# Patient Record
Sex: MALE | Race: WHITE | NOT HISPANIC OR LATINO | Employment: OTHER | URBAN - METROPOLITAN AREA
[De-identification: names, ages, dates, MRNs, and addresses within clinical notes are randomized per-mention and may not be internally consistent; named-entity substitution may affect disease eponyms.]

---

## 2020-12-04 ENCOUNTER — CONSULT (OUTPATIENT)
Dept: CARDIOLOGY | Facility: CLINIC | Age: 55
End: 2020-12-04

## 2020-12-04 VITALS
WEIGHT: 260.8 LBS | SYSTOLIC BLOOD PRESSURE: 150 MMHG | DIASTOLIC BLOOD PRESSURE: 96 MMHG | BODY MASS INDEX: 33.47 KG/M2 | HEIGHT: 74 IN | OXYGEN SATURATION: 98 % | HEART RATE: 67 BPM

## 2020-12-04 DIAGNOSIS — E78.00 PURE HYPERCHOLESTEROLEMIA: Primary | ICD-10-CM

## 2020-12-04 DIAGNOSIS — I25.10 CAD IN NATIVE ARTERY: ICD-10-CM

## 2020-12-04 PROCEDURE — 99243 OFF/OP CNSLTJ NEW/EST LOW 30: CPT | Performed by: PHYSICIAN ASSISTANT

## 2020-12-04 PROCEDURE — 93000 ELECTROCARDIOGRAM COMPLETE: CPT | Performed by: PHYSICIAN ASSISTANT

## 2020-12-04 RX ORDER — ASPIRIN 81 MG/1
81 TABLET ORAL DAILY
Qty: 30 TABLET | Refills: 30 | Status: SHIPPED | OUTPATIENT
Start: 2020-12-04

## 2020-12-04 RX ORDER — LISINOPRIL 5 MG/1
5 TABLET ORAL DAILY
COMMUNITY
Start: 2020-11-09

## 2020-12-04 RX ORDER — MULTIPLE VITAMINS W/ MINERALS TAB 9MG-400MCG
1 TAB ORAL DAILY
COMMUNITY

## 2020-12-04 NOTE — PROGRESS NOTES
[facility]  INITIAL OFFICE CONSULT      Mal Magallanes  1965  PCP: Sherry Herrera PA    SUBJECTIVE:   Mal Magallanes is a 55 y.o. male seen for a consultation visit regarding the following:     Chief Complaint:   Chief Complaint   Patient presents with   • Dizziness     consult          Consultation is requested by MURTAZA Roberts for evaluation of Dizziness (consult)        History:  Pleasant 55-year-old gent referred to our office regarding evaluation for abnormal coronary calcium score test, hypertension, dyslipidemia and strong family history of coronary disease.  The patient reports approximate 5 years ago he had a cardiac evaluation with Dr. Ryan Jackson.  During this time he remember some musculoskeletal type discomfort but underwent a stress test with Dr. Jackson which was normal.  In addition he had normal echocardiogram.  More recently he has had a difficult time trying to manage his risk factors cortices such as dyslipidemia is intolerant to multiple statin drugs and has tried every statin available at all these have caused significant myalgias muscle achiness weakness.  He states his blood pressures well controlled on Zestril therapy.  He states he has not had any chest pain or chest tightness suggesting angina pectoris.  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  He denies any chest pain when doing these activities.  He denies palpitations dizziness near syncope syncope.  He states he has family history of multiple brothers having MI and stents and therefore he would like to pursue further preventative medical therapy such as considering alternative treatments  of his familial dyslipidemia.      Cardiac PMH: (Old records have been reviewed and summarized below)  1. Atypical chest pain  a. Negative Stress test 2015, No ischemia Dr. Ryan Jackson  b. Normal Echocardiogram Normal EF, no VHD>   2. HLD  a. Intolerance to all statins due to  Myalgias  b.  10/2020  3. Abnormal Coronary Calcium score of 372 suggestive of Moderate CAD  4. HTN: Controlled on Zestril.   5. Mild Obesity  6. Remote traumatic injury, Kidney contusion, Eye injury  7. GERD  8. Family history of coronary disease    Past Medical History, Past Surgical History, Family history, Social History, and Medications were all reviewed with the patient today and updated as necessary.     Current Outpatient Medications   Medication Sig Dispense Refill   • lisinopril (PRINIVIL,ZESTRIL) 5 MG tablet Take 5 mg by mouth Daily.     • multivitamin with minerals tablet tablet Take 1 tablet by mouth Daily.     • aspirin (aspirin) 81 MG EC tablet Take 1 tablet by mouth Daily. 30 tablet 30   • Evolocumab with Infusor (REPATHA) solution cartridge Inject 3.5 mL under the skin into the appropriate area as directed Every 30 (Thirty) Days. 3.5 mL 6     No current facility-administered medications for this visit.      No Known Allergies      Past Medical History:   Diagnosis Date   • Hyperlipidemia    • Hypertension      Past Surgical History:   Procedure Laterality Date   • HAND SURGERY      R hand     Family History   Problem Relation Age of Onset   • Heart disease Mother    • Hypertension Mother    • Hyperlipidemia Mother    • COPD Father    • Hypertension Father    • Hyperlipidemia Father      Social History     Tobacco Use   • Smoking status: Never Smoker   • Smokeless tobacco: Never Used   Substance Use Topics   • Alcohol use: Not Currently       ROS:  Review of Symptoms:  General: no recent weight loss/gain, weakness or fatigue  Skin: no rashes, lumps, or other skin changes  HEENT: no dizziness, lightheadedness, or vision changes  Respiratory: no cough or hemoptysis  Cardiovascular: no palpitations, and tachycardia  Gastrointestinal: no black/tarry stools or diarrhea  Urinary: no change in frequency or urgency  Peripheral Vascular: no claudication or leg cramps  Musculoskeletal: +OA  Psychiatric:  "no depression or excessive stress  Neurological: no sensory or motor loss, no syncope  Hematologic: no anemia, easy bruising or bleeding  Endocrine: no thyroid problems, nor heat or cold intolerance         PHYSICAL EXAM:   /96 (BP Location: Left arm, Patient Position: Sitting)   Pulse 67   Ht 188 cm (74\")   Wt 118 kg (260 lb 12.8 oz)   SpO2 98%   BMI 33.48 kg/m²      Wt Readings from Last 5 Encounters:   12/04/20 118 kg (260 lb 12.8 oz)     BP Readings from Last 5 Encounters:   12/04/20 150/96       General-Well Nourished, Well developed  Eyes - PERRLA  Neck- supple, No mass  CV- regular rate and rhythm, no MRG  Lung- clear bilaterally  Abd- soft, +BS  Musc/skel - Norm strength and range of motion  Skin- warm and dry  Neuro - Alert & Oriented x 3, appropriate mood.    Medical problems and test results were reviewed with the patient today.           Labs: 10/2020      EKG:  (EKG/Tracing has been independently visualized by me and summarized below)      ECG 12 Lead    Date/Time: 12/4/2020 12:46 PM  Performed by: Wilfredo Herrera PA  Authorized by: Wilfredo Herrera PA   Comparison: not compared with previous ECG   Rhythm: sinus rhythm  Rate: normal  Conduction: conduction normal  ST Segments: ST segments normal  Other findings: non-specific ST-T wave changes    Clinical impression: abnormal EKG          ASSESSMENT   1. CAD: Abnormal coronary calcium score greater than 300 putting him in the 90th percentile range with moderate coronary disease noted.  He currently has no symptoms of chest pain or shortness of breath exertion suggesting angina pectoris.  2.  Dyslipidemia: Most recent  10/2020.  Patient has been Intolerant to every statin as they have all caused significant myalgias muscle aches and weakness.  3.  Hypertension: Well-controlled on Zestril therapy  4. Family History of CAD    PLAN  · Recommend aspirin 81 mg daily  · Recommend Repatha injection every 30 days  · CMP, lipid panel in 2 " months  · Patient would like to establish care with Dr. Feldman follow-up in 10 weeks  or sooner as needed.      Cardiology/Electrophysiology  12/04/20  12:46 EST  Will Javier SALDANA

## 2020-12-14 ENCOUNTER — DOCUMENTATION (OUTPATIENT)
Dept: CARDIOLOGY | Facility: CLINIC | Age: 55
End: 2020-12-14

## 2020-12-14 NOTE — PROGRESS NOTES
Spoke with patient on the phone today.  He was unable to afford the Repatha as he states insurance told him it would be $600.  He went ahead and started his family practice physician his dose of Crestor which she has been taking on a daily basis he states he is tolerating it so far.  We will have him return to see me in 2 months and recheck a lipid panel at that time.

## 2023-04-09 ENCOUNTER — HOSPITAL ENCOUNTER (OUTPATIENT)
Facility: HOSPITAL | Age: 58
Setting detail: OBSERVATION
Discharge: HOME | End: 2023-04-10
Attending: EMERGENCY MEDICINE | Admitting: INTERNAL MEDICINE

## 2023-04-09 ENCOUNTER — APPOINTMENT (OUTPATIENT)
Dept: GENERAL RADIOLOGY | Facility: HOSPITAL | Age: 58
End: 2023-04-09

## 2023-04-09 ENCOUNTER — APPOINTMENT (OUTPATIENT)
Dept: CT IMAGING | Facility: HOSPITAL | Age: 58
End: 2023-04-09

## 2023-04-09 DIAGNOSIS — R53.1 LEFT-SIDED WEAKNESS: ICD-10-CM

## 2023-04-09 DIAGNOSIS — R20.0 LEFT FACIAL NUMBNESS: ICD-10-CM

## 2023-04-09 DIAGNOSIS — I63.9 CEREBROVASCULAR ACCIDENT (CVA), UNSPECIFIED MECHANISM: Primary | ICD-10-CM

## 2023-04-09 PROBLEM — R73.09 ELEVATED GLUCOSE: Status: ACTIVE | Noted: 2023-04-09

## 2023-04-09 PROBLEM — E78.5 HLD (HYPERLIPIDEMIA): Status: ACTIVE | Noted: 2023-04-09

## 2023-04-09 PROBLEM — I10 HTN (HYPERTENSION): Status: ACTIVE | Noted: 2023-04-09

## 2023-04-09 LAB
ALBUMIN SERPL-MCNC: 4.3 G/DL (ref 3.5–5.2)
ALBUMIN/GLOB SERPL: 1.4 G/DL
ALP SERPL-CCNC: 78 U/L (ref 39–117)
ALT SERPL W P-5'-P-CCNC: 27 U/L (ref 1–41)
ANION GAP SERPL CALCULATED.3IONS-SCNC: 13 MMOL/L (ref 5–15)
AST SERPL-CCNC: 26 U/L (ref 1–40)
BASOPHILS # BLD AUTO: 0.05 10*3/MM3 (ref 0–0.2)
BASOPHILS NFR BLD AUTO: 0.7 % (ref 0–1.5)
BILIRUB SERPL-MCNC: 0.5 MG/DL (ref 0–1.2)
BUN BLDA-MCNC: 17 MG/DL (ref 8–26)
BUN SERPL-MCNC: 15 MG/DL (ref 6–20)
BUN/CREAT SERPL: 16.3 (ref 7–25)
CA-I BLDA-SCNC: 1.16 MMOL/L (ref 1.2–1.32)
CALCIUM SPEC-SCNC: 9.2 MG/DL (ref 8.6–10.5)
CHLORIDE BLDA-SCNC: 103 MMOL/L (ref 98–109)
CHLORIDE SERPL-SCNC: 105 MMOL/L (ref 98–107)
CO2 BLDA-SCNC: 26 MMOL/L (ref 24–29)
CO2 SERPL-SCNC: 25 MMOL/L (ref 22–29)
CREAT BLDA-MCNC: 0.9 MG/DL (ref 0.6–1.3)
CREAT SERPL-MCNC: 0.92 MG/DL (ref 0.76–1.27)
DEPRECATED RDW RBC AUTO: 43.3 FL (ref 37–54)
EGFRCR SERPLBLD CKD-EPI 2021: 97 ML/MIN/1.73
EGFRCR SERPLBLD CKD-EPI 2021: 99.6 ML/MIN/1.73
EOSINOPHIL # BLD AUTO: 0.16 10*3/MM3 (ref 0–0.4)
EOSINOPHIL NFR BLD AUTO: 2.1 % (ref 0.3–6.2)
ERYTHROCYTE [DISTWIDTH] IN BLOOD BY AUTOMATED COUNT: 13 % (ref 12.3–15.4)
GLOBULIN UR ELPH-MCNC: 3 GM/DL
GLUCOSE BLDC GLUCOMTR-MCNC: 131 MG/DL (ref 70–130)
GLUCOSE SERPL-MCNC: 133 MG/DL (ref 65–99)
HCT VFR BLD AUTO: 47.1 % (ref 37.5–51)
HCT VFR BLDA CALC: 47 % (ref 38–51)
HGB BLD-MCNC: 15.3 G/DL (ref 13–17.7)
HGB BLDA-MCNC: 16 G/DL (ref 12–17)
HOLD SPECIMEN: NORMAL
IMM GRANULOCYTES # BLD AUTO: 0.01 10*3/MM3 (ref 0–0.05)
IMM GRANULOCYTES NFR BLD AUTO: 0.1 % (ref 0–0.5)
LYMPHOCYTES # BLD AUTO: 2.42 10*3/MM3 (ref 0.7–3.1)
LYMPHOCYTES NFR BLD AUTO: 31.8 % (ref 19.6–45.3)
MCH RBC QN AUTO: 29.4 PG (ref 26.6–33)
MCHC RBC AUTO-ENTMCNC: 32.5 G/DL (ref 31.5–35.7)
MCV RBC AUTO: 90.4 FL (ref 79–97)
MONOCYTES # BLD AUTO: 0.57 10*3/MM3 (ref 0.1–0.9)
MONOCYTES NFR BLD AUTO: 7.5 % (ref 5–12)
NEUTROPHILS NFR BLD AUTO: 4.41 10*3/MM3 (ref 1.7–7)
NEUTROPHILS NFR BLD AUTO: 57.8 % (ref 42.7–76)
NRBC BLD AUTO-RTO: 0 /100 WBC (ref 0–0.2)
PLATELET # BLD AUTO: 280 10*3/MM3 (ref 140–450)
PMV BLD AUTO: 9.7 FL (ref 6–12)
POTASSIUM BLDA-SCNC: 3.7 MMOL/L (ref 3.5–4.9)
POTASSIUM SERPL-SCNC: 3.9 MMOL/L (ref 3.5–5.2)
PROT SERPL-MCNC: 7.3 G/DL (ref 6–8.5)
QT INTERVAL: 426 MS
QTC INTERVAL: 426 MS
RBC # BLD AUTO: 5.21 10*6/MM3 (ref 4.14–5.8)
SODIUM BLD-SCNC: 142 MMOL/L (ref 138–146)
SODIUM SERPL-SCNC: 143 MMOL/L (ref 136–145)
TROPONIN T SERPL HS-MCNC: <6 NG/L
TSH SERPL DL<=0.05 MIU/L-ACNC: 0.56 UIU/ML (ref 0.27–4.2)
WBC NRBC COR # BLD: 7.62 10*3/MM3 (ref 3.4–10.8)
WHOLE BLOOD HOLD COAG: NORMAL
WHOLE BLOOD HOLD SPECIMEN: NORMAL

## 2023-04-09 PROCEDURE — 99284 EMERGENCY DEPT VISIT MOD MDM: CPT

## 2023-04-09 PROCEDURE — 36415 COLL VENOUS BLD VENIPUNCTURE: CPT

## 2023-04-09 PROCEDURE — G0378 HOSPITAL OBSERVATION PER HR: HCPCS

## 2023-04-09 PROCEDURE — 93005 ELECTROCARDIOGRAM TRACING: CPT | Performed by: EMERGENCY MEDICINE

## 2023-04-09 PROCEDURE — 85014 HEMATOCRIT: CPT

## 2023-04-09 PROCEDURE — 71045 X-RAY EXAM CHEST 1 VIEW: CPT

## 2023-04-09 PROCEDURE — 99223 1ST HOSP IP/OBS HIGH 75: CPT | Performed by: FAMILY MEDICINE

## 2023-04-09 PROCEDURE — 84443 ASSAY THYROID STIM HORMONE: CPT | Performed by: NURSE PRACTITIONER

## 2023-04-09 PROCEDURE — 70498 CT ANGIOGRAPHY NECK: CPT

## 2023-04-09 PROCEDURE — 0042T HC CT CEREBRAL PERFUSION W/WO CONTRAST: CPT

## 2023-04-09 PROCEDURE — 99285 EMERGENCY DEPT VISIT HI MDM: CPT

## 2023-04-09 PROCEDURE — 84484 ASSAY OF TROPONIN QUANT: CPT | Performed by: EMERGENCY MEDICINE

## 2023-04-09 PROCEDURE — 70450 CT HEAD/BRAIN W/O DYE: CPT

## 2023-04-09 PROCEDURE — 82607 VITAMIN B-12: CPT | Performed by: NURSE PRACTITIONER

## 2023-04-09 PROCEDURE — 80053 COMPREHEN METABOLIC PANEL: CPT | Performed by: EMERGENCY MEDICINE

## 2023-04-09 PROCEDURE — 80047 BASIC METABLC PNL IONIZED CA: CPT

## 2023-04-09 PROCEDURE — 70496 CT ANGIOGRAPHY HEAD: CPT

## 2023-04-09 PROCEDURE — 25510000001 IOPAMIDOL PER 1 ML: Performed by: EMERGENCY MEDICINE

## 2023-04-09 PROCEDURE — 99223 1ST HOSP IP/OBS HIGH 75: CPT | Performed by: NURSE PRACTITIONER

## 2023-04-09 PROCEDURE — 85025 COMPLETE CBC W/AUTO DIFF WBC: CPT | Performed by: EMERGENCY MEDICINE

## 2023-04-09 RX ORDER — SODIUM CHLORIDE 0.9 % (FLUSH) 0.9 %
10 SYRINGE (ML) INJECTION AS NEEDED
Status: DISCONTINUED | OUTPATIENT
Start: 2023-04-09 | End: 2023-04-10 | Stop reason: HOSPADM

## 2023-04-09 RX ORDER — POLYETHYLENE GLYCOL 3350 17 G/17G
17 POWDER, FOR SOLUTION ORAL DAILY PRN
Status: DISCONTINUED | OUTPATIENT
Start: 2023-04-09 | End: 2023-04-10 | Stop reason: HOSPADM

## 2023-04-09 RX ORDER — SODIUM CHLORIDE 9 MG/ML
40 INJECTION, SOLUTION INTRAVENOUS AS NEEDED
Status: DISCONTINUED | OUTPATIENT
Start: 2023-04-09 | End: 2023-04-10 | Stop reason: SDUPTHER

## 2023-04-09 RX ORDER — SODIUM CHLORIDE 0.9 % (FLUSH) 0.9 %
10 SYRINGE (ML) INJECTION AS NEEDED
Status: DISCONTINUED | OUTPATIENT
Start: 2023-04-09 | End: 2023-04-10 | Stop reason: SDUPTHER

## 2023-04-09 RX ORDER — CLOPIDOGREL BISULFATE 75 MG/1
300 TABLET ORAL ONCE
Status: COMPLETED | OUTPATIENT
Start: 2023-04-09 | End: 2023-04-09

## 2023-04-09 RX ORDER — CLOPIDOGREL BISULFATE 75 MG/1
75 TABLET ORAL DAILY
Status: DISCONTINUED | OUTPATIENT
Start: 2023-04-10 | End: 2023-04-10 | Stop reason: HOSPADM

## 2023-04-09 RX ORDER — ASPIRIN 300 MG/1
300 SUPPOSITORY RECTAL DAILY
Status: DISCONTINUED | OUTPATIENT
Start: 2023-04-10 | End: 2023-04-10 | Stop reason: HOSPADM

## 2023-04-09 RX ORDER — ATORVASTATIN CALCIUM 40 MG/1
80 TABLET, FILM COATED ORAL NIGHTLY
Status: DISCONTINUED | OUTPATIENT
Start: 2023-04-09 | End: 2023-04-10 | Stop reason: HOSPADM

## 2023-04-09 RX ORDER — AMOXICILLIN 250 MG
2 CAPSULE ORAL 2 TIMES DAILY
Status: DISCONTINUED | OUTPATIENT
Start: 2023-04-09 | End: 2023-04-10 | Stop reason: HOSPADM

## 2023-04-09 RX ORDER — ACETAMINOPHEN 160 MG/5ML
650 SOLUTION ORAL EVERY 4 HOURS PRN
Status: DISCONTINUED | OUTPATIENT
Start: 2023-04-09 | End: 2023-04-10 | Stop reason: HOSPADM

## 2023-04-09 RX ORDER — BISACODYL 5 MG/1
5 TABLET, DELAYED RELEASE ORAL DAILY PRN
Status: DISCONTINUED | OUTPATIENT
Start: 2023-04-09 | End: 2023-04-10 | Stop reason: HOSPADM

## 2023-04-09 RX ORDER — BISACODYL 10 MG
10 SUPPOSITORY, RECTAL RECTAL DAILY PRN
Status: DISCONTINUED | OUTPATIENT
Start: 2023-04-09 | End: 2023-04-10 | Stop reason: HOSPADM

## 2023-04-09 RX ORDER — ASPIRIN 81 MG/1
81 TABLET, CHEWABLE ORAL DAILY
Status: DISCONTINUED | OUTPATIENT
Start: 2023-04-10 | End: 2023-04-10 | Stop reason: HOSPADM

## 2023-04-09 RX ORDER — ACETAMINOPHEN 325 MG/1
650 TABLET ORAL EVERY 4 HOURS PRN
Status: DISCONTINUED | OUTPATIENT
Start: 2023-04-09 | End: 2023-04-10 | Stop reason: HOSPADM

## 2023-04-09 RX ORDER — SODIUM CHLORIDE 0.9 % (FLUSH) 0.9 %
10 SYRINGE (ML) INJECTION EVERY 12 HOURS SCHEDULED
Status: DISCONTINUED | OUTPATIENT
Start: 2023-04-09 | End: 2023-04-10 | Stop reason: SDUPTHER

## 2023-04-09 RX ORDER — ACETAMINOPHEN 650 MG/1
650 SUPPOSITORY RECTAL EVERY 4 HOURS PRN
Status: DISCONTINUED | OUTPATIENT
Start: 2023-04-09 | End: 2023-04-10 | Stop reason: HOSPADM

## 2023-04-09 RX ORDER — SODIUM CHLORIDE 0.9 % (FLUSH) 0.9 %
10 SYRINGE (ML) INJECTION EVERY 12 HOURS SCHEDULED
Status: DISCONTINUED | OUTPATIENT
Start: 2023-04-09 | End: 2023-04-10 | Stop reason: HOSPADM

## 2023-04-09 RX ORDER — SODIUM CHLORIDE 9 MG/ML
40 INJECTION, SOLUTION INTRAVENOUS AS NEEDED
Status: DISCONTINUED | OUTPATIENT
Start: 2023-04-09 | End: 2023-04-10 | Stop reason: HOSPADM

## 2023-04-09 RX ADMIN — CLOPIDOGREL BISULFATE 300 MG: 75 TABLET ORAL at 18:28

## 2023-04-09 RX ADMIN — Medication 10 ML: at 21:37

## 2023-04-09 RX ADMIN — IOPAMIDOL 125 ML: 755 INJECTION, SOLUTION INTRAVENOUS at 16:22

## 2023-04-09 NOTE — Clinical Note
Level of Care: Telemetry [5]   Diagnosis: Cerebrovascular accident (CVA), unspecified mechanism [[id number]]   Admitting Physician: RISHI MIRAMONTES [[id number]]

## 2023-04-09 NOTE — H&P
[facility] Medicine Services  HISTORY AND PHYSICAL    Patient Name: Mal Magallanes  : 1965  MRN: [id number]  Primary Care Physician: Sherry Herrera PA  Date of admission: 2023    Subjective   Subjective     Chief Complaint:  Left sided facial numbness with left leg weakness    HPI:  Mal Magallanes is a 57 y.o. male PMH HTN, HLD presenting with left sided facial and tongue paresthesia with left leg weakness that started at 4am when he got up from sleep. He states he was staggering due to the left leg weakness. He states he was fine when he went to sleep last night at 10 pm. He went to [facility] where CT head reported negative. He received  mg at the hospital and was sent home because they would not be able to do a MRI of the brain until Monday. Pt's symptoms worsened so his wife brought him to [facility] for further evaluation. He reports brief episode of dizziness, nausea with dry heaves. He denies chest pain, heart palpitations, shortness of breath, difficulty swallowing.     Review of Systems   Constitutional: Positive for activity change and fatigue.   Eyes: Negative.    Respiratory: Negative.    Cardiovascular: Negative.    Gastrointestinal: Positive for nausea.        Dry heaves   Endocrine: Negative.    Genitourinary: Negative.    Musculoskeletal:        Neck and low back pain   Skin: Negative.    Allergic/Immunologic: Negative.    Neurological: Positive for dizziness, weakness, numbness and headaches.        Left sided facial, tongue paresthesia. Left leg weakness.   Hematological: Negative.    Psychiatric/Behavioral: Negative.       Personal History     Past Medical History:   Diagnosis Date   • Hyperlipidemia    • Hypertension              Past Surgical History:   Procedure Laterality Date   • HAND SURGERY      R hand       Family History:  family history includes COPD in his father; Heart disease in his mother; Hyperlipidemia in his father and mother;  Hypertension in his father and mother.     Social History:  reports that he has never smoked. He has never used smokeless tobacco. He reports that he does not currently use alcohol. He reports that he does not use drugs.  Social History     Social History Narrative   • Not on file       Medications:  Evolocumab with Infusor, aspirin, lisinopril, and multivitamin with minerals    No Known Allergies    Objective   Objective     Vital Signs:   Temp:  [97.7 °F (36.5 °C)] 97.7 °F (36.5 °C)  Heart Rate:  [53-63] 53  Resp:  [18] 18  BP: (129-172)/() 129/88    Physical Exam  Vitals reviewed.   Constitutional:       General: He is not in acute distress.     Appearance: Normal appearance. He is not ill-appearing.   HENT:      Head: Normocephalic and atraumatic.      Mouth/Throat:      Mouth: Mucous membranes are moist.   Eyes:      Conjunctiva/sclera: Conjunctivae normal.   Cardiovascular:      Rate and Rhythm: Regular rhythm. Bradycardia present.      Heart sounds: Normal heart sounds. No murmur heard.    No friction rub. No gallop.   Pulmonary:      Effort: Pulmonary effort is normal.      Breath sounds: Normal breath sounds.   Abdominal:      General: Bowel sounds are normal. There is no distension.      Palpations: Abdomen is soft.      Tenderness: There is no abdominal tenderness.   Musculoskeletal:         General: No swelling. Normal range of motion.      Cervical back: Neck supple.      Right lower leg: No edema.      Left lower leg: No edema.   Skin:     General: Skin is warm and dry.   Neurological:      General: No focal deficit present.      Mental Status: He is alert and oriented to person, place, and time.      Cranial Nerves: No cranial nerve deficit.      Sensory: No sensory deficit.      Motor: No weakness.   Psychiatric:         Mood and Affect: Mood normal.         Behavior: Behavior normal.        Result Review:  I have personally reviewed the results from the time of this admission to 4/9/2023 18:25  EDT and agree with these findings:  [x]  Laboratory list / accordion  []  Microbiology  [x]  Radiology  [x]  EKG/Telemetry   []  Cardiology/Vascular   []  Pathology  []  Old records  []  Other  Most notable findings include:     LAB RESULTS:      Lab 04/09/23  1618 04/09/23  1616   WBC  --  7.62   HEMOGLOBIN  --  15.3   HEMOGLOBIN, POC 16.0  --    HEMATOCRIT  --  47.1   HEMATOCRIT POC 47  --    PLATELETS  --  280   NEUTROS ABS  --  4.41   IMMATURE GRANS (ABS)  --  0.01   LYMPHS ABS  --  2.42   MONOS ABS  --  0.57   EOS ABS  --  0.16   MCV  --  90.4         Lab 04/09/23  1618 04/09/23  1616   SODIUM  --  143   POTASSIUM  --  3.9   CHLORIDE  --  105   CO2  --  25.0   ANION GAP  --  13.0   BUN  --  15   CREATININE 0.90 0.92   EGFR 99.6 97.0   GLUCOSE  --  133*   CALCIUM  --  9.2         Lab 04/09/23  1616   TOTAL PROTEIN 7.3   ALBUMIN 4.3   GLOBULIN 3.0   ALT (SGPT) 27   AST (SGOT) 26   BILIRUBIN 0.5   ALK PHOS 78         Lab 04/09/23  1616   HSTROP T <6                 Brief Urine Lab Results     None        Microbiology Results (last 10 days)     ** No results found for the last 240 hours. **          CT Angiogram Neck    Result Date: 4/9/2023  CT ANGIOGRAM HEAD W AI ANALYSIS OF LVO, CT ANGIOGRAM NECK Date of Exam: 4/9/2023 4:14 PM EDT Indication: Neuro deficit, acute stroke suspected Neuro deficit, acute stroke suspected. Comparison: None available. Technique: CTA of the head was performed after the uneventful intravenous administration of 125 mL Isovue-370. Reconstructed coronal and sagittal images were also obtained. In addition, a 3-D volume rendered image was created for interpretation. Automated exposure control and iterative reconstruction methods were used. Findings: Anterior circulation: Internal common carotid arteries are patent. Anterior cerebral arteries are patent. Anterior communicating artery seen. The middle cerebral arteries are patent including M2 division. No evidence of aneurysm. Posterior  circulation: Vertebral arteries are patent. Posterior inferior, anterior inferior and superior cerebellar arteries are seen. The basilar artery is patent. Fetal origin of the left posterior cerebral artery. Posterior cerebral arteries appear patent. Left posterior communicating artery appears patent. No evidence of aneurysm. Venous structures: Dural venous sinuses and central cerebral veins appear grossly patent. Internal jugular veins are grossly patent. Bones: Mild degenerative changes of the cervical spine. No high-grade canal or foraminal stenosis. No evidence of acute fracture. Soft tissues: No abnormal brain parenchymal enhancement. Aerodigestive tract is grossly patent. Mildly enlarged thyroid without dominant nodule. No suspicious adenopathy. Visualized thoracic vessels are grossly patent. Lung apices appear clear.     Impression: Impression: No evidence of flow-limiting stenosis or occlusion of the major vessels of the head and neck. Electronically Signed: Dalton Figueroa  4/9/2023 4:51 PM EDT  Workstation ID: [id number]    XR Chest 1 View    Result Date: 4/9/2023  XR CHEST 1 VW Date of Exam: 4/9/2023 4:42 PM EDT Indication: Acute Stroke Protocol (onset < 12 hrs). Comparison: None available. Findings: Cardiomediastinal silhouette is within normal limits. Likely small left pleural effusion associated left basilar opacity. No pneumothorax. No evidence of acute osseous abnormalities. Visualized upper abdomen is unremarkable.     Impression: Impression: Likely small left pleural effusion with associated left basilar opacity which may reflect infection or atelectasis. Electronically Signed: Dalton Figueroa  4/9/2023 5:57 PM EDT  Workstation ID: [id number]    CT Head Without Contrast Stroke Protocol    Result Date: 4/9/2023  CT HEAD WO CONTRAST STROKE PROTOCOL Date of Exam: 4/9/2023 4:01 PM EDT Indication: Neuro deficit, acute, stroke suspected Neuro deficit, acute stroke suspected. Comparison: None available.  Technique: Axial CT images were obtained of the head without contrast administration.  Reconstructed coronal and sagittal images were also obtained. Automated exposure control and iterative construction methods were used. Scan Time:  4:05 PM Results discussed with stroke team at 4:17 PM. Findings: Parenchyma:No acute intraparenchymal hemorrhage. No loss of gray-white differentiation to suggest large territory infarct. Normal parenchymal volume. No substantial white matter disease. No midline shift or herniation. Ventricles and extra axial spaces:Normal caliber of ventricles and sulci. No extra axial fluid collection seen. Other:Orbits are grossly intact. Scattered paranasal sinus mucosal thickening. Mastoid air cells are clear. Calvarium is intact. No substantial intracranial atherosclerotic calcification.     Impression: Impression: No evidence of acute intracranial hemorrhage or large territorial infarct. Electronically Signed: Dalton Figueroa  4/9/2023 4:23 PM EDT  Workstation ID: [id number]    CT Angiogram Head w AI Analysis of LVO    Result Date: 4/9/2023  CT ANGIOGRAM HEAD W AI ANALYSIS OF LVO, CT ANGIOGRAM NECK Date of Exam: 4/9/2023 4:14 PM EDT Indication: Neuro deficit, acute stroke suspected Neuro deficit, acute stroke suspected. Comparison: None available. Technique: CTA of the head was performed after the uneventful intravenous administration of 125 mL Isovue-370. Reconstructed coronal and sagittal images were also obtained. In addition, a 3-D volume rendered image was created for interpretation. Automated exposure control and iterative reconstruction methods were used. Findings: Anterior circulation: Internal common carotid arteries are patent. Anterior cerebral arteries are patent. Anterior communicating artery seen. The middle cerebral arteries are patent including M2 division. No evidence of aneurysm. Posterior circulation: Vertebral arteries are patent. Posterior inferior, anterior inferior and  superior cerebellar arteries are seen. The basilar artery is patent. Fetal origin of the left posterior cerebral artery. Posterior cerebral arteries appear patent. Left posterior communicating artery appears patent. No evidence of aneurysm. Venous structures: Dural venous sinuses and central cerebral veins appear grossly patent. Internal jugular veins are grossly patent. Bones: Mild degenerative changes of the cervical spine. No high-grade canal or foraminal stenosis. No evidence of acute fracture. Soft tissues: No abnormal brain parenchymal enhancement. Aerodigestive tract is grossly patent. Mildly enlarged thyroid without dominant nodule. No suspicious adenopathy. Visualized thoracic vessels are grossly patent. Lung apices appear clear.     Impression: Impression: No evidence of flow-limiting stenosis or occlusion of the major vessels of the head and neck. Electronically Signed: Dalton Figueroa  4/9/2023 4:51 PM EDT  Workstation ID: [id number]    CT CEREBRAL PERFUSION WITH & WITHOUT CONTRAST    Result Date: 4/9/2023  CT CEREBRAL PERFUSION W WO CONTRAST Date of Exam: 4/9/2023 4:14 PM EDT Indication: Neuro deficit, acute stroke suspected.  Comparison: None available. Technique: Axial CT images of the brain were obtained prior to and after the administration of 125 mL Isovue-370. Core blood volume, core blood flow, mean transit time, and Tmax images were obtained utilizing the Rapid software protocol. A limited CT angiogram of the head was also performed to measure the blood vessel density. The radiation dose reduction device was turned on for each scan per the ALARA (As Low as Reasonably Achievable) protocol. Findings: Symmetric and preserved CBV and CBF. No evidence of elevated T. Max. There is mildly elevated T-Max in the watershed regions consistent with mild borders on perfusion (volume 22 mL).     Impression: Impression: No CT perfusion evidence of infarct or ischemia. Electronically Signed: Dalton Figueroa   4/9/2023 4:40 PM EDT  Workstation ID: [id number]          Assessment & Plan   Assessment & Plan       Cerebrovascular accident (CVA), unspecified mechanism    Elevated glucose    HTN (hypertension)    HLD (hyperlipidemia)    L facial paresthesia, Left leg weakness   Suspected R hemispheric CVA  - CT head OSH reportedly neg, CT head here also with no acute intracranial hemorrhage or large territorial infarct.   - CTA head/neck: No evidence of flow-limiting stenosis or occlusion   - CT perfusion: no evidence of infarct or ischemia  - MRI brain pending  - PT/OT/SLP  - A1C, Lipid panel, TSH  - ECHO  - received ASA 325mg at OSH, continue ASA 81 mg daily tomorrow  - received loading dose of plavix 300 mg today, followed by 75 mg daily  - Stroke neurology to follow  - Patient states his L facial paresthesias worsen when he tries to move/ambulate     HTN/HLD  - Pt does not take any meds at home  - allow for permissive HTN    Elevated Glucose  - A1C    Chronic neck and LBP    DVT prophylaxis:  SCDs    CODE STATUS:    Level Of Support Discussed With: Patient  Code Status (Patient has no pulse and is not breathing): CPR (Attempt to Resuscitate)  Medical Interventions (Patient has pulse or is breathing): Full Support    Expected Discharge 04/12/2023    Total time spent: 59 min  Time spent includes time reviewing chart, face-to-face time, counseling patient/family/caregiver, ordering medications/tests/procedures, communicating with other health care professionals, documenting clinical information in the electronic health record, and coordination of care.     This note has been completed as part of a split-shared workflow.     Signature: Electronically signed by ISRA Joshua, 04/09/23, 6:15 PM EDT.      Attending   Admission Attestation       I have performed an independent face-to-face diagnostic evaluation including performing an independent physical examination as documented here.  The documented plan of care above was  reviewed and developed with the advanced practice clinician (APC).      Brief Summary Statement:   Mal Magallanes is a 57 y.o. male with PMHx HTN and HLD who presents to ED with CC with L facial paresthesia and L leg weakness. He went to ED last night at [facility], CT head negative, he was given ASA 325mg and sent home as they could not perform a MRI until Monday. His symptoms persisted so he presented to [facility] ED. CT perfusion, CTA head/neck normal. MRI brain pending. Stroke team has evaluated. [facility] medicine asked to admit.    Remainder of detailed HPI is as noted by APC and has been reviewed and/or edited by me for completeness.    Attending Physical Exam:  Temp:  [97.7 °F (36.5 °C)] 97.7 °F (36.5 °C)  Heart Rate:  [50-63] 50  Resp:  [18] 18  BP: (129-172)/() 152/94    Constitutional: Awake, alert, NAD, non-toxic   Eyes: PERRLA, sclerae anicteric, no conjunctival injection  HENT: NCAT, mucous membranes moist  Neck: Supple, no thyromegaly, no lymphadenopathy, trachea midline  Respiratory: Clear to auscultation bilaterally, nonlabored respirations   Cardiovascular: RRR, no murmurs, rubs, or gallops, palpable pedal pulses bilaterally  Gastrointestinal: Positive bowel sounds, soft, nontender, nondistended  Musculoskeletal: No bilateral ankle edema, no clubbing or cyanosis to extremities  Psychiatric: Appropriate affect, cooperative  Neurologic: Oriented x 3, strength symmetric in all extremities, Cranial Nerves grossly intact to confrontation, speech clear  Skin: No rashes    Brief Assessment/Plan :  Admit to telemetry. MRI brain pending. Stroke Neuro to follow. Loaded with Plavix.  See detailed assessment and plan developed with APC which I have reviewed and/or edited for completeness.            Mariana Zuñiga DO  04/09/23       Total time spent: 22 min  Time spent includes time reviewing chart, face-to-face time, counseling patient/family/caregiver, ordering medications/tests/procedures, communicating  with other health care professionals, documenting clinical information in the electronic health record, and coordination of care.

## 2023-04-09 NOTE — ED PROVIDER NOTES
[facility]    EMERGENCY DEPARTMENT ENCOUNTER      Pt Name: Mal Magallanes  MRN: [id number]  YOB: 1965  Date of evaluation: 4/9/2023  Provider: Milton Rice DO    CHIEF COMPLAINT       Chief Complaint   Patient presents with   • Numbness         HISTORY OF PRESENT ILLNESS  (Location/Symptom, Timing/Onset, Context/Setting, Quality, Duration, Modifying Factors, Severity.)   Mal Magallanes is a 57 y.o. male who presents to the emergency department for evaluation with a family member secondary to a concern for weakness, difficulty with ambulation, left-sided facial numbness, tingling which she notes occurred sometime between 10 PM last night at 4:00 this morning.  He woke up at 4 AM and felt he was off balance, having difficulty with ambulation, noticed some numbness, tingling in weakness and of his left face, with heaviness of his left lower extremity.  Went to outside facility had a CT scan of the head which was negative, was unable to perform MRI at that time, discharged home.  Has remote history of hypertension, has been off of his medications for 4 years.  Denies any recent illness, no fever, chills, nausea or vomiting.  No history of stroke, TIA, seizure activity.  Denies any fall, injury or head trauma.  Has a very mild generalized headache.  Still has some heaviness in the left lower extremity, feels like he is stumbling forward when he ambulates which is changed for the patient, also with some intermittent numbness of the left face along the lip and nasal ridge.      Nursing notes were reviewed.      PAST MEDICAL HISTORY     Past Medical History:   Diagnosis Date   • Hyperlipidemia    • Hypertension          SURGICAL HISTORY       Past Surgical History:   Procedure Laterality Date   • HAND SURGERY      R hand         CURRENT MEDICATIONS       Current Facility-Administered Medications:   •  clopidogrel (PLAVIX) tablet 300 mg, 300 mg, Oral, Once **AND** [START ON 4/10/2023] clopidogrel (PLAVIX)  tablet 75 mg, 75 mg, Oral, Daily, Carmen Garnica APRN  •  sodium chloride 0.9 % flush 10 mL, 10 mL, Intravenous, PRN, Milton Rice,     Current Outpatient Medications:   •  aspirin (aspirin) 81 MG EC tablet, Take 1 tablet by mouth Daily., Disp: 30 tablet, Rfl: 30  •  Evolocumab with Infusor (REPATHA) solution cartridge, Inject 3.5 mL under the skin into the appropriate area as directed Every 30 (Thirty) Days., Disp: 3.5 mL, Rfl: 6  •  lisinopril (PRINIVIL,ZESTRIL) 5 MG tablet, Take 5 mg by mouth Daily., Disp: , Rfl:   •  multivitamin with minerals tablet tablet, Take 1 tablet by mouth Daily., Disp: , Rfl:     ALLERGIES     Patient has no known allergies.    FAMILY HISTORY       Family History   Problem Relation Age of Onset   • Heart disease Mother    • Hypertension Mother    • Hyperlipidemia Mother    • COPD Father    • Hypertension Father    • Hyperlipidemia Father           SOCIAL HISTORY       Social History     Socioeconomic History   • Marital status:    Tobacco Use   • Smoking status: Never   • Smokeless tobacco: Never   Substance and Sexual Activity   • Alcohol use: Not Currently   • Drug use: Never   • Sexual activity: Defer         PHYSICAL EXAM    (up to 7 for level 4, 8 or more for level 5)     Vitals:    04/09/23 1637 04/09/23 1700 04/09/23 1730 04/09/23 1731   BP:  153/95 129/88    Patient Position:       Pulse: 56 54  53   Resp:       Temp:       TempSrc:       SpO2: 99% 97%  98%   Weight:       Height:           Physical Exam  General : Patient is awake, alert, oriented, in no acute distress, nontoxic appearing  HEENT: Pupils are equally round, EOMI, conjunctivae clear, there is no injection no icterus.  Oral mucosa is moist, uvula midline  Neck: Neck is supple, full range of motion, trachea midline  Cardiac: Heart regular rate, rhythm, no murmurs, rubs, or gallops  Lungs: Lungs are clear to auscultation, there is no wheezing, rhonchi, or rales. There is no use of accessory  muscles  Chest wall: There is no tenderness to palpation over the chest wall or over ribs  Abdomen: Abdomen is soft, nontender, nondistended. There are no firm or pulsatile masses, no rebound rigidity or guarding  Musculoskeletal: 5 out of 5 strength in all 4 extremities.  No focal muscle deficits are appreciated  Neuro: Patient awake alert answering question appropriately, no facial droop, no slurred speech, mild paresthesia along the V2, V3 distribution of the face, no pronator drift, no stat echo kinesia, there is no sensory loss in bilateral upper or lower extremities, please refer to stroke navigator for full NIH score  Dermatology: Skin is warm and dry  Psych: Mentation is grossly normal, cognition is grossly normal. Affect is appropriate      DIAGNOSTIC RESULTS     EKG:  All EKGs are interpreted by the Emergency Department Physician who either signs or Co-signs this chart in the absence of a cardiologist.    ECG 12 Lead Stroke Evaluation   Final Result   Test Reason : Stroke Evaluation   Blood Pressure :   */*   mmHG   Vent. Rate :  60 BPM     Atrial Rate :  60 BPM      P-R Int : 186 ms          QRS Dur :  94 ms       QT Int : 426 ms       P-R-T Axes :   6  14  16 degrees      QTc Int : 426 ms      Normal sinus rhythm   Normal ECG   No previous ECGs available   Confirmed by SHELTON ISLAS MD ([id number]) on 4/9/2023 4:40:25 PM      Referred By: EDMD           Confirmed By: SHELTON ISLAS MD          RADIOLOGY:     [] Radiologist's Report Reviewed:  CT Angiogram Head w AI Analysis of LVO   Final Result   Impression:   No evidence of flow-limiting stenosis or occlusion of the major vessels of the head and neck.      Electronically Signed: Dalton Figueroa     4/9/2023 4:51 PM EDT     Workstation ID: [id number]      CT Angiogram Neck   Final Result   Impression:   No evidence of flow-limiting stenosis or occlusion of the major vessels of the head and neck.      Electronically Signed: Dalton Figueroa     4/9/2023 4:51  PM EDT     Workstation ID: [id number]      CT CEREBRAL PERFUSION WITH & WITHOUT CONTRAST   Final Result   Impression:   No CT perfusion evidence of infarct or ischemia.      Electronically Signed: Dalton Figueroa     4/9/2023 4:40 PM EDT     Workstation ID: [id number]      CT Head Without Contrast Stroke Protocol   Final Result   Impression:   No evidence of acute intracranial hemorrhage or large territorial infarct.      Electronically Signed: Dalton Figueroa     4/9/2023 4:23 PM EDT     Workstation ID: [id number]      XR Chest 1 View    (Results Pending)       I ordered and independently reviewed the above noted radiographic studies.      I viewed images of CT head which showed no acute intracranial abnormality per my independent interpretation.    See radiologist's dictation for official interpretation.      ED BEDSIDE ULTRASOUND:   Performed by ED Physician - none    LABS:    I have reviewed and interpreted all of the currently available lab results from this visit (if applicable):  Results for orders placed or performed during the hospital encounter of 04/09/23   Comprehensive Metabolic Panel    Specimen: Blood   Result Value Ref Range    Glucose 133 (H) 65 - 99 mg/dL    BUN 15 6 - 20 mg/dL    Creatinine 0.92 0.76 - 1.27 mg/dL    Sodium 143 136 - 145 mmol/L    Potassium 3.9 3.5 - 5.2 mmol/L    Chloride 105 98 - 107 mmol/L    CO2 25.0 22.0 - 29.0 mmol/L    Calcium 9.2 8.6 - 10.5 mg/dL    Total Protein 7.3 6.0 - 8.5 g/dL    Albumin 4.3 3.5 - 5.2 g/dL    ALT (SGPT) 27 1 - 41 U/L    AST (SGOT) 26 1 - 40 U/L    Alkaline Phosphatase 78 39 - 117 U/L    Total Bilirubin 0.5 0.0 - 1.2 mg/dL    Globulin 3.0 gm/dL    A/G Ratio 1.4 g/dL    BUN/Creatinine Ratio 16.3 7.0 - 25.0    Anion Gap 13.0 5.0 - 15.0 mmol/L    eGFR 97.0 >60.0 mL/min/1.73   Single High Sensitivity Troponin T    Specimen: Blood   Result Value Ref Range    HS Troponin T <6 <15 ng/L   CBC Auto Differential    Specimen: Blood   Result Value Ref Range    WBC 7.62  3.40 - 10.80 10*3/mm3    RBC 5.21 4.14 - 5.80 10*6/mm3    Hemoglobin 15.3 13.0 - 17.7 g/dL    Hematocrit 47.1 37.5 - 51.0 %    MCV 90.4 79.0 - 97.0 fL    MCH 29.4 26.6 - 33.0 pg    MCHC 32.5 31.5 - 35.7 g/dL    RDW 13.0 12.3 - 15.4 %    RDW-SD 43.3 37.0 - 54.0 fl    MPV 9.7 6.0 - 12.0 fL    Platelets 280 140 - 450 10*3/mm3    Neutrophil % 57.8 42.7 - 76.0 %    Lymphocyte % 31.8 19.6 - 45.3 %    Monocyte % 7.5 5.0 - 12.0 %    Eosinophil % 2.1 0.3 - 6.2 %    Basophil % 0.7 0.0 - 1.5 %    Immature Grans % 0.1 0.0 - 0.5 %    Neutrophils, Absolute 4.41 1.70 - 7.00 10*3/mm3    Lymphocytes, Absolute 2.42 0.70 - 3.10 10*3/mm3    Monocytes, Absolute 0.57 0.10 - 0.90 10*3/mm3    Eosinophils, Absolute 0.16 0.00 - 0.40 10*3/mm3    Basophils, Absolute 0.05 0.00 - 0.20 10*3/mm3    Immature Grans, Absolute 0.01 0.00 - 0.05 10*3/mm3    nRBC 0.0 0.0 - 0.2 /100 WBC   POC CHEM 8    Specimen: Blood   Result Value Ref Range    Glucose 131 (H) 70 - 130 mg/dL    BUN 17 8 - 26 mg/dL    Creatinine 0.90 0.60 - 1.30 mg/dL    Sodium 142 138 - 146 mmol/L    POC Potassium 3.7 3.5 - 4.9 mmol/L    Chloride 103 98 - 109 mmol/L    Total CO2 26 24 - 29 mmol/L    Hemoglobin 16.0 12.0 - 17.0 g/dL    Hematocrit 47 38 - 51 %    Ionized Calcium 1.16 (L) 1.20 - 1.32 mmol/L    eGFR 99.6 >60.0 mL/min/1.73   ECG 12 Lead Stroke Evaluation   Result Value Ref Range    QT Interval 426 ms    QTC Interval 426 ms   Green Top (Gel)   Result Value Ref Range    Extra Tube Hold for add-ons.    Lavender Top   Result Value Ref Range    Extra Tube hold for add-on    Gold Top - SST   Result Value Ref Range    Extra Tube Hold for add-ons.    Light Blue Top   Result Value Ref Range    Extra Tube Hold for add-ons.         If labs were ordered, I independently reviewed the results and considered them in treating the patient.      EMERGENCY DEPARTMENT COURSE and DIFFERENTIAL DIAGNOSIS/MDM:   Vitals:  AS OF 17:50 EDT    BP - 129/88  HR - 53  TEMP - 97.7 °F (36.5 °C) (Oral)  O2  SATS - 98%      Orders placed during this visit:  Orders Placed This Encounter   Procedures   • CT Head Without Contrast Stroke Protocol   • CT Angiogram Head w AI Analysis of LVO   • CT Angiogram Neck   • CT CEREBRAL PERFUSION WITH & WITHOUT CONTRAST   • XR Chest 1 View   • Comprehensive Metabolic Panel   • Pinson Draw   • Single High Sensitivity Troponin T   • CBC Auto Differential   • NPO Diet NPO Type: Strict NPO   • Activate Code Stoke   • Perform NIH Stroke Scale   • Measure Actual Weight   • Notify MD for SBP < 80 or > 200   • Notify MD for SBP greater than 140 if hemorrhagic stroke   • Head of Bed 30 Degrees or Less   • Undress and Gown   • Vital Signs   • Neuro Checks   • No Hypotonic Fluids   • Nursing Swallow Assessment   • Vital Signs   • Pulse Oximetry, Continuous   • Cardiac Monitoring   • Notify Provider   • Nursing Dysphagia Screening (Complete Prior to Giving Anything By Mouth)   • RN to Place Order SLP Consult - Eval & Treat Choosing Reason of RN Dysphagia Screen Failed   • Nurse to Call MD or Nutrition Services for Diet if Patient Passes Dysphagia Screen   • Intake and Output   • Neuro Checks   • NIHSS Assessment   • Order CT Head Without Contrast for Neurological Decline   • Activity As Tolerated   • Inpatient Neurology Consult Stroke   • Oxygen Therapy- Nasal Cannula; 2 LPM; Titrate for SPO2: equal to or greater than, 94%   • POC CHEM 8   • POCT Protime/INR   • POC CHEM 8   • ECG 12 Lead Stroke Evaluation   • Insert Large-Bore Peripheral IV - Right AC Preferred   • CBC & Differential   • Green Top (Gel)   • Lavender Top   • Gold Top - SST   • Gray Top   • Light Blue Top       All labs have been independently reviewed by me.  All radiology studies have been reviewed by me and the radiologist dictating the report.  All EKG's have been independently viewed and interpreted by me.      Discussion below represents my analysis of pertinent findings related to patient's condition, differential  diagnosis, treatment plan and final disposition.    Differential diagnosis:  The differential diagnosis associated with the patient's presentation includes: CVA, TIA, headache, migraine with aura    Additional sources  Discussed/ obtained information from independent historians:   [] Spouse  [] Parent  [] Family member  [] Friend  [] EMS   [] Other:    External (non-ED) record review:   [] Inpatient record:   [] Office record:   [] Outpatient record:   [] Prior Outpatient labs:   [] Prior Outpatient radiology:   [] Primary Care record:   [] Outside ED record:   [] Other:     Patient's care impacted by:   [] Diabetes  [] Hypertension  [] Hyperlipidemia  [] Coronary Artery Disease   [] COPD   [] Cancer   [] Tobacco Abuse   [] Substance Abuse    [] Other:     Care significantly affected by Social Determinants of Health (housing and economic circumstances, unemployment)    [] Yes     [x] No   If yes, Patient's care significantly limited by Social Determinants of Health including:   [] Inadequate housing   [] Low income   [] Alcoholism and drug addiction in family   [] Problems related to primary support group   [] Unemployment   [] Problems related to employment   [] Other Social Determinants of Health:       MEDICATIONS ADMINISTERED IN ED:  Medications   sodium chloride 0.9 % flush 10 mL (has no administration in time range)   clopidogrel (PLAVIX) tablet 300 mg (has no administration in time range)     And   clopidogrel (PLAVIX) tablet 75 mg (has no administration in time range)   iopamidol (ISOVUE-370) 76 % injection 125 mL (125 mL Intravenous Given 4/9/23 1622)              Patient with left facial, numbness, tingling, difficulty with his gait, as well as left leg heaviness.  Had a negative CT head in outside facility.  Symptoms are persisting throughout the afternoon.  Given the concern for underlying stroke/TIA I did discuss the case with her stroke navigator, we will initiate a stroke work-up and evaluation.   Results reviewed as above.  No significant abnormalities on initial CT head, angiography, perfusion.  Patient has multiple risk factors, found numbers with stroke history, stenosis.  He will require advanced stroke work-up with MRI, vascular imaging and ultrasound.  We will plan for admission to the hospital for further work-up neurological treatment assessment.  Case discussed with hospitalist, Dr. Zuñiga, for admission.    PROCEDURES:  Procedures    CRITICAL CARE TIME    Total Critical Care time was 30 minutes, excluding separately reportable procedures.,  Left-sided weakness, left leg heaviness, left-sided facial numbness, tingling, stroke work-up required neurochecks, reassessments, discussion with consultants. There was a high probability of clinically significant/life threatening deterioration in the patient's condition which required my urgent intervention.      FINAL IMPRESSION      1. Cerebrovascular accident (CVA), unspecified mechanism    2. Left-sided weakness    3. Left facial numbness          DISPOSITION/PLAN     ED Disposition     ED Disposition   Decision to Admit    Condition   --    Comment   --             Comment: Please note this report has been produced using speech recognition software.      Milton Rice DO  Attending Emergency Physician       Milton Rice DO  04/09/23 [id number]

## 2023-04-09 NOTE — CONSULTS
Stroke Consult Note    Patient Name: Mal Magallanes   MRN: [id number]  Age: 57 y.o.  Sex: male  : 1965    Primary Care Physician: Sherry Herrera PA  Referring Physician: Dr. Jessy BARON STROKE TEAM CALLED: 16:05 EST     TIME PATIENT SEEN: 16:10 EST    Handedness: Right  Race:     Chief Complaint/Reason for Consultation: Left leg weakness, left facial paresthesias    Subjective .  HPI: Mal Magallanes is a 57-year-old, , right-handed male with known diagnosis of HTN (has not been on BP medications for several years) and HLD who presents with left leg weakness, left facial paresthesias and difficulty ambulating.  Last known well last night at 22:00.  Patient awoke around 4:00 and noted numbness on the left side of his tongue and lips.  He had a difficult time walking due to left leg heaviness.  He initially presented to [facility] where he had a CT scan which was reportedly negative.  He was told he probably had a stroke and would need an MRI but would not be able to have one until Monday.  He elected to drive to [facility] for further evaluation.    On exam patient is alert and oriented x3.  His speech is clear and fluent, no aphasia.  His face is symmetrical, tongue protrudes midline.  Gaze is normal, all visual fields are intact.  No drift in any extremity.  Sensation is intact to light touch in bilateral upper and lower extremities, he does have some decrease sensation on his left perioral region.  No ataxia or dysmetria.  He reports a mild frontal headache and difficulty with balance.  He did take aspirin 325 mg prior to coming to the hospital.      Last Known Normal Date/Time: 2023 at 2200    Review of Systems   Constitutional: Negative for chills and fever.   HENT: Negative for trouble swallowing.    Eyes: Negative for visual disturbance.   Respiratory: Negative for cough and shortness of breath.    Cardiovascular: Negative for chest pain and palpitations.   Musculoskeletal:  Positive for gait problem.   Skin: Negative.    Neurological: Positive for dizziness, weakness, numbness and headaches. Negative for speech difficulty.   Psychiatric/Behavioral: Negative.       Past Medical History:   Diagnosis Date   • Hyperlipidemia    • Hypertension      Past Surgical History:   Procedure Laterality Date   • HAND SURGERY      R hand     Family History   Problem Relation Age of Onset   • Heart disease Mother    • Hypertension Mother    • Hyperlipidemia Mother    • COPD Father    • Hypertension Father    • Hyperlipidemia Father      Social History     Socioeconomic History   • Marital status:    Tobacco Use   • Smoking status: Never   • Smokeless tobacco: Never   Substance and Sexual Activity   • Alcohol use: Not Currently   • Drug use: Never   • Sexual activity: Defer     No Known Allergies  Prior to Admission medications    Medication Sig Start Date End Date Taking? Authorizing Provider   aspirin (aspirin) 81 MG EC tablet Take 1 tablet by mouth Daily. 12/4/20   Wilfredo Herrera PA   Evolocumab with Infusor (REPATHA) solution cartridge Inject 3.5 mL under the skin into the appropriate area as directed Every 30 (Thirty) Days. 12/4/20   Wilfredo Herrera PA   lisinopril (PRINIVIL,ZESTRIL) 5 MG tablet Take 5 mg by mouth Daily. 11/9/20   Elías Montes MD   multivitamin with minerals tablet tablet Take 1 tablet by mouth Daily.    Elías Montes MD             Objective     Temp:  [97.7 °F (36.5 °C)] 97.7 °F (36.5 °C)  Heart Rate:  [63] 63  Resp:  [18] 18  BP: (172)/(106) 172/106  Neurological Exam  Mental Status  Awake, alert and oriented to person, place and time.Alert. Oriented to person, place, and time. Speech is normal. Language is fluent with no aphasia. Attention and concentration are normal.    Cranial Nerves  CN II: Visual fields full to confrontation.  CN III, IV, VI: Extraocular movements intact bilaterally. Normal lids and orbits bilaterally. Pupils equal round  and reactive to light bilaterally.  CN V:  Right: Facial sensation is normal.  Left: Diminished sensation of the entire left side of the face.  CN VII: Full and symmetric facial movement.  CN XI: Shoulder shrug strength is normal.  CN XII: Tongue midline without atrophy or fasciculations.    Motor  Normal muscle bulk throughout. No fasciculations present. Normal muscle tone. No abnormal involuntary movements. Strength is 5/5 throughout all four extremities.    Sensory  Light touch is normal in upper and lower extremities.     Coordination    Finger-to-nose, rapid alternating movements and heel-to-shin normal bilaterally without dysmetria.    Gait    Not tested.      Physical Exam  Vitals reviewed.   Constitutional:       Appearance: Normal appearance.   HENT:      Head: Normocephalic and atraumatic.   Eyes:      General: Lids are normal.      Extraocular Movements: Extraocular movements intact.      Pupils: Pupils are equal, round, and reactive to light.   Cardiovascular:      Rate and Rhythm: Bradycardia present.   Pulmonary:      Effort: Pulmonary effort is normal. No respiratory distress.   Musculoskeletal:         General: No swelling. Normal range of motion.      Cervical back: Normal range of motion and neck supple.   Skin:     General: Skin is warm and dry.   Neurological:      Mental Status: He is alert and oriented to person, place, and time.      Cranial Nerves: Cranial nerve deficit present.      Sensory: Sensory deficit present.      Motor: Motor strength is normal. No weakness.      Coordination: Coordination is intact.   Psychiatric:         Mood and Affect: Mood normal.         Speech: Speech normal.         Behavior: Behavior normal.         Acute Stroke Data    IV Thrombolytic (TPA/Tenecteplase) Inclusion / Exclusion Criteria    Time: 16:20 EDT  Person Administering Scale: ISRA Carr    Inclusion Criteria  [x]   18 years of age or greater   []   Onset of symptoms < 4.5 hours before  beginning treatment (stroke onset = time patient was last seen well or without symptoms).   []   Diagnosis of acute ischemic stroke causing measurable disabling deficit (Complete Hemianopia, Any Aphasia, Visual or Sensory Extinction, Any weakness limiting sustained effort against gravity)   []   Any remaining deficit considered potentially disabling in view of patient and practitioner   Exclusion criteria (Do not proceed with Alteplase if any are checked under exclusion criteria)  [x]   Onset unknown or GREATER than 4.5 hours   []   ICH on CT/MRI   []   CT demonstrates hypodensity representing acute or subacute infarct   []   Significant head trauma or prior stroke in the previous 3 months   []   Symptoms suggestive of subarachnoid hemorrhage   []   History of un-ruptured intracranial aneurysm GREATER than 10 mm   []   Recent intracranial or intraspinal surgery within the last 3 months   []   Arterial puncture at a non-compressible site in the previous 7 days   []   Active internal bleeding   []   Acute bleeding tendency   []   Platelet count LESS than 100,000 for known hematological diseases such as leukemia, thrombocytopenia or chronic cirrhosis   []   Current use of anticoagulant with INR GREATER than 1.7 or PT GREATER than 15 seconds, aPTT GREATER than 40 seconds   []   Heparin received within 48 hours, resulting in abnormally elevated aPTT GREATER than upper limit of normal   []   Current use of direct thrombin inhibitors or direct factor Xa inhibitors in the past 48 hours   []   Elevated blood pressure refractory to treatment (systolic GREATER than 185 mm/Hg or diastolic  GREATER than 110 mm/Hg   []   Suspected infective endocarditis and aortic arch dissection   []   Current use of therapeutic treatment dose of low-molecular-weight heparin (LMWH) within the previous 24 hours   []   Structural GI malignancy or bleed   Relative exclusion for all patients  []   Only minor nondisabling symptoms   []   Pregnancy    []   Seizure at onset with postictal residual neurological impairments   []   Major surgery or previous trauma within past 14 days   []   History of previous spontaneous ICH, intracranial neoplasm, or AV malformation   []   Postpartum (within previous 14 days)   []   Recent GI or urinary tract hemorrhage (within previous 21 days)   []   Recent acute MI (within previous 3 months)   []   History of unruptured intracranial aneurysm LESS than 10 mm   []   History of ruptured intracranial aneurysm   []   Blood glucose LESS than 50 mg/dL (2.7 mmol/L)   []   Dural puncture within the last 7 days   []   Known GREATER than 10 cerebral microbleeds   Additional exclusions for patients with symptoms onset between 3 and 4.5 hours.  []   Age > 80.   []   On any anticoagulants regardless of INR  >>> Warfarin (Coumadin), Heparin, Enoxaparin (Lovenox), fondaparinux (Arixtra), bivalirudin (Angiomax), Argatroban, dabigatran (Pradaxa), rivaroxaban (Xarelto), or apixaban (Eliquis)   []   Severe stroke (NIHSS > 25).   []   History of BOTH diabetes and previous ischemic stroke.   []   The risks and benefits have been discussed with the patient or family related to the administration of IV alteplase for stroke symptoms.   []   I have discussed and reviewed the patient's case and imaging with the attending prior to IV Thrombolytic (TPA/Tenecteplase).    Time Thrombolytic administered       [facility] Meds:  Scheduled-    Infusions-     PRNs- •  sodium chloride    Functional Status Prior to Current Stroke/[place] Score: MRS 0    NIH Stroke Scale  Time: 16:20 EDT  Person Administering Scale: ISRA Carr    1a  Level of consciousness: 0=alert; keenly responsive   1b. LOC questions:  0= answers both questions correctly   1c. LOC commands: 0=Performs both tasks correctly   2.  Best Gaze: 0=normal   3.  Visual: 0=No visual loss   4. Facial Palsy: 0=Normal symmetric movement   5a.  Motor left arm: 0=No drift, limb holds 90 (or 45) degrees  for full 10 seconds   5b.  Motor right arm: 0=No drift, limb holds 90 (or 45) degrees for full 10 seconds   6a. motor left le=No drift, limb holds 90 (or 45) degrees for full 10 seconds   6b  Motor right le=No drift, limb holds 90 (or 45) degrees for full 10 seconds   7. Limb Ataxia: 0=Absent   8.  Sensory: 1=Mild to moderate sensory loss; patient feels pinprick is less sharp or is dull on the affected side; there is a loss of superficial pain with pinprick but patient is aware He is being touched   9. Best Language:  0=No aphasia, normal   10. Dysarthria: 0=Normal   11. Extinction and Inattention: 0=No abnormality    Total:   1       Results Reviewed:  I have personally reviewed current lab, radiology, and data and agree with results.  WBC   Date Value Ref Range Status   2023 7.62 3.40 - 10.80 10*3/mm3 Final     RBC   Date Value Ref Range Status   2023 5.21 4.14 - 5.80 10*6/mm3 Final     Hemoglobin   Date Value Ref Range Status   2023 16.0 12.0 - 17.0 g/dL Final     Comment:     Serial Number: [id number]:  [id number]   2023 15.3 13.0 - 17.7 g/dL Final     Hematocrit   Date Value Ref Range Status   2023 47 38 - 51 % Final   2023 47.1 37.5 - 51.0 % Final     MCV   Date Value Ref Range Status   2023 90.4 79.0 - 97.0 fL Final     MCH   Date Value Ref Range Status   2023 29.4 26.6 - 33.0 pg Final     MCHC   Date Value Ref Range Status   2023 32.5 31.5 - 35.7 g/dL Final     RDW   Date Value Ref Range Status   2023 13.0 12.3 - 15.4 % Final     RDW-SD   Date Value Ref Range Status   2023 43.3 37.0 - 54.0 fl Final     MPV   Date Value Ref Range Status   2023 9.7 6.0 - 12.0 fL Final     Platelets   Date Value Ref Range Status   2023 280 140 - 450 10*3/mm3 Final     Neutrophil %   Date Value Ref Range Status   2023 57.8 42.7 - 76.0 % Final     Lymphocyte %   Date Value Ref Range Status   2023 31.8 19.6 - 45.3 % Final      Monocyte %   Date Value Ref Range Status   04/09/2023 7.5 5.0 - 12.0 % Final     Eosinophil %   Date Value Ref Range Status   04/09/2023 2.1 0.3 - 6.2 % Final     Basophil %   Date Value Ref Range Status   04/09/2023 0.7 0.0 - 1.5 % Final     Immature Grans %   Date Value Ref Range Status   04/09/2023 0.1 0.0 - 0.5 % Final     Neutrophils, Absolute   Date Value Ref Range Status   04/09/2023 4.41 1.70 - 7.00 10*3/mm3 Final     Lymphocytes, Absolute   Date Value Ref Range Status   04/09/2023 2.42 0.70 - 3.10 10*3/mm3 Final     Monocytes, Absolute   Date Value Ref Range Status   04/09/2023 0.57 0.10 - 0.90 10*3/mm3 Final     Eosinophils, Absolute   Date Value Ref Range Status   04/09/2023 0.16 0.00 - 0.40 10*3/mm3 Final     Basophils, Absolute   Date Value Ref Range Status   04/09/2023 0.05 0.00 - 0.20 10*3/mm3 Final     Immature Grans, Absolute   Date Value Ref Range Status   04/09/2023 0.01 0.00 - 0.05 10*3/mm3 Final     nRBC   Date Value Ref Range Status   04/09/2023 0.0 0.0 - 0.2 /100 WBC Final     Lab Results   Component Value Date    GLUCOSE 133 (H) 04/09/2023    BUN 15 04/09/2023    CREATININE 0.90 04/09/2023    EGFR 99.6 04/09/2023    BCR 16.3 04/09/2023    K 3.9 04/09/2023    CO2 25.0 04/09/2023    CALCIUM 9.2 04/09/2023    ALBUMIN 4.3 04/09/2023    BILITOT 0.5 04/09/2023    AST 26 04/09/2023    ALT 27 04/09/2023     CT Angiogram Neck    Result Date: 4/9/2023  Impression: No evidence of flow-limiting stenosis or occlusion of the major vessels of the head and neck. Electronically Signed: Dalton Figueroa  4/9/2023 4:51 PM EDT  Workstation ID: [id number]    CT Head Without Contrast Stroke Protocol    Result Date: 4/9/2023  Impression: No evidence of acute intracranial hemorrhage or large territorial infarct. Electronically Signed: Dalton Figueroa  4/9/2023 4:23 PM EDT  Workstation ID: [id number]    CT Angiogram Head w AI Analysis of LVO    Result Date: 4/9/2023  Impression: No evidence of flow-limiting stenosis  or occlusion of the major vessels of the head and neck. Electronically Signed: Dalton HollandCarolina  4/9/2023 4:51 PM EDT  Workstation ID: [id number]    CT CEREBRAL PERFUSION WITH & WITHOUT CONTRAST    Result Date: 4/9/2023  Impression: No CT perfusion evidence of infarct or ischemia. Electronically Signed: Dalton CallejasCarolina  4/9/2023 4:40 PM EDT  Workstation ID: [id number]          Assessment/Plan:  57-year-old, , right-handed male with known diagnosis of HTN (has not been on BP medications for several years) and HLD who presents with left leg weakness, left facial paresthesias and difficulty ambulating.  Last known well last night at 22:00.  NIHSS 1.  CT head negative for acute abnormality.  CT perfusion is negative.  CTA head and neck negative for hemodynamically flow-limiting stenosis, no LVO, no aneurysm.    PTA antiplatelet: Did take aspirin 325 mg prior to arrival, but does not take any routine antiplatelets  -PTA anticoagulant: None      1. Suspected right hemispheric infarct  -TIA/ischemic stroke order set without thrombolytic therapy  -MRI brain without  -Continue aspirin 81 mg beginning tomorrow  -Load with Plavix 300 mg now followed by 75 mg daily  -Echo with bubble study  -A1c and lipid panel  -PT/OT/SLP evals as indicated  -Activity as tolerated  -Cardiac diet if passes bedside stroke dysphagia screen  -Stroke neurology will follow    2.  HTN  - Autoregulation of blood pressure, SBP < 220    3.  HLD  -Lipid panel  -Increase to atorvastatin 80 mg daily      Plan of care was discussed with patient, family, Dr. Rice.  Stroke neurology will continue to follow, thank you for including us in the care of this patient.          Carmen Garnica, ISRA  April 9, 2023  17:14 EDT

## 2023-04-10 ENCOUNTER — READMISSION MANAGEMENT (OUTPATIENT)
Dept: CALL CENTER | Facility: HOSPITAL | Age: 58
End: 2023-04-10

## 2023-04-10 ENCOUNTER — APPOINTMENT (OUTPATIENT)
Dept: CARDIOLOGY | Facility: HOSPITAL | Age: 58
End: 2023-04-10

## 2023-04-10 ENCOUNTER — APPOINTMENT (OUTPATIENT)
Dept: MRI IMAGING | Facility: HOSPITAL | Age: 58
End: 2023-04-10

## 2023-04-10 VITALS
BODY MASS INDEX: 32.6 KG/M2 | WEIGHT: 254 LBS | TEMPERATURE: 97.9 F | RESPIRATION RATE: 18 BRPM | HEART RATE: 57 BPM | OXYGEN SATURATION: 97 % | SYSTOLIC BLOOD PRESSURE: 145 MMHG | HEIGHT: 74 IN | DIASTOLIC BLOOD PRESSURE: 95 MMHG

## 2023-04-10 LAB
ALBUMIN SERPL-MCNC: 4.1 G/DL (ref 3.5–5.2)
ALBUMIN/GLOB SERPL: 1.5 G/DL
ALP SERPL-CCNC: 78 U/L (ref 39–117)
ALT SERPL W P-5'-P-CCNC: 28 U/L (ref 1–41)
ANION GAP SERPL CALCULATED.3IONS-SCNC: 9 MMOL/L (ref 5–15)
ASCENDING AORTA: 3.7 CM
AST SERPL-CCNC: 24 U/L (ref 1–40)
BASOPHILS # BLD AUTO: 0.06 10*3/MM3 (ref 0–0.2)
BASOPHILS NFR BLD AUTO: 0.9 % (ref 0–1.5)
BH CV ECHO MEAS - AO MAX PG: 6 MMHG
BH CV ECHO MEAS - AO MEAN PG: 4 MMHG
BH CV ECHO MEAS - AO ROOT DIAM: 3.5 CM
BH CV ECHO MEAS - AO V2 MAX: 122 CM/SEC
BH CV ECHO MEAS - AO V2 VTI: 27.3 CM
BH CV ECHO MEAS - AVA(I,D): 2.7 CM2
BH CV ECHO MEAS - EDV(CUBED): 148.9 ML
BH CV ECHO MEAS - EDV(MOD-SP2): 163 ML
BH CV ECHO MEAS - EDV(MOD-SP4): 202 ML
BH CV ECHO MEAS - EF(MOD-BP): 57.1 %
BH CV ECHO MEAS - EF(MOD-SP2): 56.8 %
BH CV ECHO MEAS - EF(MOD-SP4): 55.5 %
BH CV ECHO MEAS - ESV(CUBED): 46.7 ML
BH CV ECHO MEAS - ESV(MOD-SP2): 70.4 ML
BH CV ECHO MEAS - ESV(MOD-SP4): 89.8 ML
BH CV ECHO MEAS - FS: 32.1 %
BH CV ECHO MEAS - IVS/LVPW: 1 CM
BH CV ECHO MEAS - IVSD: 1.2 CM
BH CV ECHO MEAS - LA DIMENSION: 4.5 CM
BH CV ECHO MEAS - LAT PEAK E' VEL: 8.8 CM/SEC
BH CV ECHO MEAS - LV DIASTOLIC VOL/BSA (35-75): 84 CM2
BH CV ECHO MEAS - LV MASS(C)D: 256.6 GRAMS
BH CV ECHO MEAS - LV MAX PG: 3.6 MMHG
BH CV ECHO MEAS - LV MEAN PG: 2 MMHG
BH CV ECHO MEAS - LV SYSTOLIC VOL/BSA (12-30): 37.3 CM2
BH CV ECHO MEAS - LV V1 MAX: 95 CM/SEC
BH CV ECHO MEAS - LV V1 VTI: 23.5 CM
BH CV ECHO MEAS - LVIDD: 5.3 CM
BH CV ECHO MEAS - LVIDS: 3.6 CM
BH CV ECHO MEAS - LVOT AREA: 3.1 CM2
BH CV ECHO MEAS - LVOT DIAM: 2 CM
BH CV ECHO MEAS - LVPWD: 1.2 CM
BH CV ECHO MEAS - MED PEAK E' VEL: 10.2 CM/SEC
BH CV ECHO MEAS - MV A MAX VEL: 63.9 CM/SEC
BH CV ECHO MEAS - MV DEC TIME: 0.23 MSEC
BH CV ECHO MEAS - MV E MAX VEL: 84.4 CM/SEC
BH CV ECHO MEAS - MV E/A: 1.32
BH CV ECHO MEAS - PA ACC TIME: 0.12 SEC
BH CV ECHO MEAS - PA PR(ACCEL): 23.7 MMHG
BH CV ECHO MEAS - PA V2 MAX: 92.4 CM/SEC
BH CV ECHO MEAS - RAP SYSTOLE: 3 MMHG
BH CV ECHO MEAS - RVSP: 14 MMHG
BH CV ECHO MEAS - SI(MOD-SP2): 38.5 ML/M2
BH CV ECHO MEAS - SI(MOD-SP4): 46.6 ML/M2
BH CV ECHO MEAS - SV(LVOT): 73.8 ML
BH CV ECHO MEAS - SV(MOD-SP2): 92.6 ML
BH CV ECHO MEAS - SV(MOD-SP4): 112.2 ML
BH CV ECHO MEAS - TAPSE (>1.6): 2.28 CM
BH CV ECHO MEAS - TR MAX PG: 10.5 MMHG
BH CV ECHO MEAS - TR MAX VEL: 161.7 CM/SEC
BH CV ECHO MEASUREMENTS AVERAGE E/E' RATIO: 8.88
BH CV ECHO SHUNT ASSESSMENT PERFORMED (HIDDEN SCRIPTING): 1
BH CV VAS BP LEFT ARM: NORMAL MMHG
BH CV XLRA - RV BASE: 3.6 CM
BH CV XLRA - RV LENGTH: 6.4 CM
BH CV XLRA - RV MID: 3 CM
BH CV XLRA - TDI S': 13.9 CM/SEC
BILIRUB SERPL-MCNC: 0.4 MG/DL (ref 0–1.2)
BUN SERPL-MCNC: 16 MG/DL (ref 6–20)
BUN/CREAT SERPL: 14.8 (ref 7–25)
CALCIUM SPEC-SCNC: 8.7 MG/DL (ref 8.6–10.5)
CHLORIDE SERPL-SCNC: 108 MMOL/L (ref 98–107)
CHOLEST SERPL-MCNC: 192 MG/DL (ref 0–200)
CO2 SERPL-SCNC: 26 MMOL/L (ref 22–29)
CREAT SERPL-MCNC: 1.08 MG/DL (ref 0.76–1.27)
DEPRECATED RDW RBC AUTO: 44.4 FL (ref 37–54)
EGFRCR SERPLBLD CKD-EPI 2021: 80 ML/MIN/1.73
EOSINOPHIL # BLD AUTO: 0.22 10*3/MM3 (ref 0–0.4)
EOSINOPHIL NFR BLD AUTO: 3.5 % (ref 0.3–6.2)
ERYTHROCYTE [DISTWIDTH] IN BLOOD BY AUTOMATED COUNT: 13.2 % (ref 12.3–15.4)
GLOBULIN UR ELPH-MCNC: 2.8 GM/DL
GLUCOSE BLDC GLUCOMTR-MCNC: 108 MG/DL (ref 70–130)
GLUCOSE SERPL-MCNC: 114 MG/DL (ref 65–99)
HBA1C MFR BLD: 6.4 % (ref 4.8–5.6)
HCT VFR BLD AUTO: 45.6 % (ref 37.5–51)
HDLC SERPL-MCNC: 38 MG/DL (ref 40–60)
HGB BLD-MCNC: 14.7 G/DL (ref 13–17.7)
IMM GRANULOCYTES # BLD AUTO: 0.02 10*3/MM3 (ref 0–0.05)
IMM GRANULOCYTES NFR BLD AUTO: 0.3 % (ref 0–0.5)
LDLC SERPL CALC-MCNC: 127 MG/DL (ref 0–100)
LDLC/HDLC SERPL: 3.27 {RATIO}
LEFT ATRIUM VOLUME INDEX: 43.2 ML/M2
LYMPHOCYTES # BLD AUTO: 2.28 10*3/MM3 (ref 0.7–3.1)
LYMPHOCYTES NFR BLD AUTO: 35.8 % (ref 19.6–45.3)
MAXIMAL PREDICTED HEART RATE: 163 BPM
MCH RBC QN AUTO: 29.2 PG (ref 26.6–33)
MCHC RBC AUTO-ENTMCNC: 32.2 G/DL (ref 31.5–35.7)
MCV RBC AUTO: 90.7 FL (ref 79–97)
MONOCYTES # BLD AUTO: 0.68 10*3/MM3 (ref 0.1–0.9)
MONOCYTES NFR BLD AUTO: 10.7 % (ref 5–12)
NEUTROPHILS NFR BLD AUTO: 3.11 10*3/MM3 (ref 1.7–7)
NEUTROPHILS NFR BLD AUTO: 48.8 % (ref 42.7–76)
NRBC BLD AUTO-RTO: 0 /100 WBC (ref 0–0.2)
PLATELET # BLD AUTO: 256 10*3/MM3 (ref 140–450)
PMV BLD AUTO: 10.2 FL (ref 6–12)
POTASSIUM SERPL-SCNC: 4.2 MMOL/L (ref 3.5–5.2)
PROT SERPL-MCNC: 6.9 G/DL (ref 6–8.5)
RBC # BLD AUTO: 5.03 10*6/MM3 (ref 4.14–5.8)
SODIUM SERPL-SCNC: 143 MMOL/L (ref 136–145)
STRESS TARGET HR: 139 BPM
TRIGL SERPL-MCNC: 148 MG/DL (ref 0–150)
VIT B12 BLD-MCNC: 698 PG/ML (ref 211–946)
VLDLC SERPL-MCNC: 27 MG/DL (ref 5–40)
WBC NRBC COR # BLD: 6.37 10*3/MM3 (ref 3.4–10.8)

## 2023-04-10 PROCEDURE — G0378 HOSPITAL OBSERVATION PER HR: HCPCS

## 2023-04-10 PROCEDURE — 85025 COMPLETE CBC W/AUTO DIFF WBC: CPT | Performed by: NURSE PRACTITIONER

## 2023-04-10 PROCEDURE — 96374 THER/PROPH/DIAG INJ IV PUSH: CPT

## 2023-04-10 PROCEDURE — 70551 MRI BRAIN STEM W/O DYE: CPT

## 2023-04-10 PROCEDURE — 25010000002 LORAZEPAM PER 2 MG: Performed by: INTERNAL MEDICINE

## 2023-04-10 PROCEDURE — 99233 SBSQ HOSP IP/OBS HIGH 50: CPT | Performed by: PSYCHIATRY & NEUROLOGY

## 2023-04-10 PROCEDURE — G0108 DIAB MANAGE TRN  PER INDIV: HCPCS

## 2023-04-10 PROCEDURE — 83036 HEMOGLOBIN GLYCOSYLATED A1C: CPT | Performed by: NURSE PRACTITIONER

## 2023-04-10 PROCEDURE — 82962 GLUCOSE BLOOD TEST: CPT

## 2023-04-10 PROCEDURE — 93306 TTE W/DOPPLER COMPLETE: CPT

## 2023-04-10 PROCEDURE — 93306 TTE W/DOPPLER COMPLETE: CPT | Performed by: INTERNAL MEDICINE

## 2023-04-10 PROCEDURE — 92523 SPEECH SOUND LANG COMPREHEN: CPT

## 2023-04-10 PROCEDURE — 97530 THERAPEUTIC ACTIVITIES: CPT

## 2023-04-10 PROCEDURE — 99238 HOSP IP/OBS DSCHRG MGMT 30/<: CPT | Performed by: INTERNAL MEDICINE

## 2023-04-10 PROCEDURE — 97161 PT EVAL LOW COMPLEX 20 MIN: CPT

## 2023-04-10 PROCEDURE — 80061 LIPID PANEL: CPT | Performed by: NURSE PRACTITIONER

## 2023-04-10 PROCEDURE — 80053 COMPREHEN METABOLIC PANEL: CPT | Performed by: NURSE PRACTITIONER

## 2023-04-10 PROCEDURE — 97165 OT EVAL LOW COMPLEX 30 MIN: CPT

## 2023-04-10 RX ORDER — ATORVASTATIN CALCIUM 40 MG/1
40 TABLET, FILM COATED ORAL NIGHTLY
Qty: 30 TABLET | Refills: 11 | Status: SHIPPED | OUTPATIENT
Start: 2023-04-10 | End: 2024-04-09

## 2023-04-10 RX ORDER — ASPIRIN 81 MG/1
81 TABLET, CHEWABLE ORAL DAILY
Qty: 21 TABLET | Refills: 0 | Status: SHIPPED | OUTPATIENT
Start: 2023-04-11 | End: 2023-05-02

## 2023-04-10 RX ORDER — LORAZEPAM 2 MG/ML
1 INJECTION INTRAMUSCULAR ONCE
Status: COMPLETED | OUTPATIENT
Start: 2023-04-10 | End: 2023-04-10

## 2023-04-10 RX ORDER — LOSARTAN POTASSIUM 25 MG/1
25 TABLET ORAL DAILY
Qty: 30 TABLET | Refills: 11 | Status: SHIPPED | OUTPATIENT
Start: 2023-04-10 | End: 2024-04-09

## 2023-04-10 RX ORDER — CLOPIDOGREL BISULFATE 75 MG/1
75 TABLET ORAL DAILY
Qty: 21 TABLET | Refills: 0 | Status: SHIPPED | OUTPATIENT
Start: 2023-04-10 | End: 2023-05-01

## 2023-04-10 RX ORDER — ASPIRIN 325 MG
325 TABLET, DELAYED RELEASE (ENTERIC COATED) ORAL DAILY
Qty: 100 TABLET | Refills: 11 | Status: SHIPPED | OUTPATIENT
Start: 2023-05-03 | End: 2024-05-02

## 2023-04-10 RX ADMIN — LORAZEPAM 1 MG: 2 INJECTION INTRAMUSCULAR; INTRAVENOUS at 11:49

## 2023-04-10 RX ADMIN — ASPIRIN 81 MG: 81 TABLET, CHEWABLE ORAL at 08:45

## 2023-04-10 RX ADMIN — CLOPIDOGREL BISULFATE 75 MG: 75 TABLET ORAL at 08:45

## 2023-04-10 NOTE — DISCHARGE SUMMARY
[facility] Medicine Services  DISCHARGE SUMMARY    Patient Name: Mal Magallanes  : 1965  MRN: [id number]    Date of Admission: 2023  4:07 PM  Date of Discharge:  1423  Primary Care Physician: Sherry Herrera PA    Consults     Date and Time Order Name Status Description    2023  9:06 PM Inpatient Neurology Consult Stroke      2023  4:06 PM Inpatient Neurology Consult Stroke            Hospital Course     Presenting Problem:   Cerebrovascular accident (CVA), unspecified mechanism [I63.9]    Active Hospital Problems    Diagnosis  POA   • **Cerebrovascular accident (CVA), unspecified mechanism [I63.9]  Yes   • Elevated glucose [R73.09]  Unknown   • HTN (hypertension) [I10]  Unknown   • HLD (hyperlipidemia) [E78.5]  Unknown      Resolved Hospital Problems   No resolved problems to display.      Discharge diagnosis:  · TIA  · Essential hypertension  · Dyslipidemia  · Prediabetes, A1c 6.4%   · Obesity BMI 32.6    Hospital Course:  Mal Magallanes is a 57 y.o. male PMH HTN, HLD presenting with left sided facial and tongue paresthesia with left leg weakness.  CT head and CTA head and neck were unremarkable.  CT perfusion scan with no evidence of infarct or reversible ischemia.  Symptoms improved within 24 hours and MRI brain was negative which is consistent with TIA.  A1c 6.4%.  LDL is 120.  Seen by neurology and was discharged on dual antiplatelet therapy for 21 days then full dose aspirin.  Also discharged on losartan and statins with co-Q10 (previously developed muscle cramps with statins).  With improvement of his symptoms, it was felt that he is safe to be discharged home with follow-up with neurology/stroke clinic in 2 months.    started at 4am when he got up from sleep. He states he was staggering due to the left leg weakness. He states he was fine when he went to sleep last night at 10 pm. He went to [facility] where CT head reported negative. He received  mg at  the hospital and was sent home because they would not be able to do a MRI of the brain until Monday. Pt's symptoms worsened so his wife brought him to [facility] for further evaluation. He reports brief episode of dizziness, nausea with dry heaves. He denies chest pain, heart palpitations, shortness of breath, difficulty swallowing.     Discharge Follow Up Recommendations for outpatient labs/diagnostics:  PCP in 1 week  Stroke clinic in 2 months    Day of Discharge     HPI:   I have seen and evaluated the patient in the afternoon.  Symptoms completely resolved.  Denies any headache or dizziness.  No other acute complaints.  Wanting to go home    Review of Systems  General : no fevers, chills  CVS: No chest pain, palpitations  Respiratory: No cough, dyspnea  GI: No N/V/D, abd pain  10 point review of systems is negative except for what is mentioned in HPI    Vital Signs:   Temp:  [97.7 °F (36.5 °C)-98.1 °F (36.7 °C)] 97.9 °F (36.6 °C)  Heart Rate:  [50-63] 57  Resp:  [16-18] 18  BP: (127-172)/() 145/95      Physical Exam:  General: Comfortable, not in distress, conversant and cooperative  Head: Atraumatic and normocephalic  Eyes: No Icterus. No pallor  Ears:  Ears appear intact with no abnormalities noted  Throat: No oral lesions, no thrush  Neck: Supple, trachea midline  Lungs: Clear to auscultation bilaterally, equal air entry, no wheezing or crackles  Heart:  Normal S1 and S2, no murmur, no gallop, No JVD, no lower extremity swelling  Abdomen:  Soft, no tenderness, no organomegaly, normal bowel sounds, no organomegaly  Extremities: pulses equal bilaterally  Skin: No bleeding, bruising or rash, normal skin turgor and elasticity  Neurologic: Cranial nerves appear intact with no evidence of facial asymmetry, normal motor and sensory functions in all 4 extremities  Psych: Alert and oriented x 3, normal mood    Pertinent  and/or Most Recent Results     LAB RESULTS:      Lab 04/10/23  0527 04/09/23  1618 04/09/23  1616    WBC 6.37  --  7.62   HEMOGLOBIN 14.7  --  15.3   HEMOGLOBIN, POC  --  16.0  --    HEMATOCRIT 45.6  --  47.1   HEMATOCRIT POC  --  47  --    PLATELETS 256  --  280   NEUTROS ABS 3.11  --  4.41   IMMATURE GRANS (ABS) 0.02  --  0.01   LYMPHS ABS 2.28  --  2.42   MONOS ABS 0.68  --  0.57   EOS ABS 0.22  --  0.16   MCV 90.7  --  90.4         Lab 04/10/23  0527 04/09/23  1618 04/09/23  1616   SODIUM 143  --  143   POTASSIUM 4.2  --  3.9   CHLORIDE 108*  --  105   CO2 26.0  --  25.0   ANION GAP 9.0  --  13.0   BUN 16  --  15   CREATININE 1.08 0.90 0.92   EGFR 80.0 99.6 97.0   GLUCOSE 114*  --  133*   CALCIUM 8.7  --  9.2   HEMOGLOBIN A1C 6.40*  --   --    TSH  --   --  0.559         Lab 04/10/23  0527 04/09/23  1616   TOTAL PROTEIN 6.9 7.3   ALBUMIN 4.1 4.3   GLOBULIN 2.8 3.0   ALT (SGPT) 28 27   AST (SGOT) 24 26   BILIRUBIN 0.4 0.5   ALK PHOS 78 78         Lab 04/09/23  1616   HSTROP T <6         Lab 04/10/23  0527   CHOLESTEROL 192   LDL CHOL 127*   HDL CHOL 38*   TRIGLYCERIDES 148         Lab 04/09/23  1616   VITAMIN B 12 698         Brief Urine Lab Results     None        Microbiology Results (last 10 days)     ** No results found for the last 240 hours. **          CT Angiogram Neck    Result Date: 4/9/2023  CT ANGIOGRAM HEAD W AI ANALYSIS OF LVO, CT ANGIOGRAM NECK Date of Exam: 4/9/2023 4:14 PM EDT Indication: Neuro deficit, acute stroke suspected Neuro deficit, acute stroke suspected. Comparison: None available. Technique: CTA of the head was performed after the uneventful intravenous administration of 125 mL Isovue-370. Reconstructed coronal and sagittal images were also obtained. In addition, a 3-D volume rendered image was created for interpretation. Automated exposure control and iterative reconstruction methods were used. Findings: Anterior circulation: Internal common carotid arteries are patent. Anterior cerebral arteries are patent. Anterior communicating artery seen. The middle cerebral arteries are  patent including M2 division. No evidence of aneurysm. Posterior circulation: Vertebral arteries are patent. Posterior inferior, anterior inferior and superior cerebellar arteries are seen. The basilar artery is patent. Fetal origin of the left posterior cerebral artery. Posterior cerebral arteries appear patent. Left posterior communicating artery appears patent. No evidence of aneurysm. Venous structures: Dural venous sinuses and central cerebral veins appear grossly patent. Internal jugular veins are grossly patent. Bones: Mild degenerative changes of the cervical spine. No high-grade canal or foraminal stenosis. No evidence of acute fracture. Soft tissues: No abnormal brain parenchymal enhancement. Aerodigestive tract is grossly patent. Mildly enlarged thyroid without dominant nodule. No suspicious adenopathy. Visualized thoracic vessels are grossly patent. Lung apices appear clear.     Impression: No evidence of flow-limiting stenosis or occlusion of the major vessels of the head and neck. Electronically Signed: Dalton Figueroa  4/9/2023 4:51 PM EDT  Workstation ID: [id number]    MRI Brain Without Contrast    Result Date: 4/10/2023  MRI BRAIN WO CONTRAST Date of Exam: 4/10/2023 11:37 AM EDT Indication: Stroke, follow up.  Comparison: April 9, 2023 CT head Technique:  Routine multiplanar/multisequence sequence images of the brain were obtained without contrast administration. Findings: There are a few scattered foci of increased T2 and FLAIR signal in the periventricular and subcortical white matter consistent with chronic microvascular ischemic change. There is no mass, mass effect or midline shift. There is no evidence of intracranial hemorrhage. There are no abnormal extra-axial fluid collections. Intracranial flow voids are preserved. The diffusion-weighted sequences are normal.     Impression: Mild chronic microvascular ischemic change. No acute intracranial process. No evidence of an acute or subacute  infarct. Electronically Signed: Tk Chris  4/10/2023 12:42 PM EDT  Workstation ID: [id number]    XR Chest 1 View    Result Date: 4/9/2023  XR CHEST 1 VW Date of Exam: 4/9/2023 4:42 PM EDT Indication: Acute Stroke Protocol (onset < 12 hrs). Comparison: None available. Findings: Cardiomediastinal silhouette is within normal limits. Likely small left pleural effusion associated left basilar opacity. No pneumothorax. No evidence of acute osseous abnormalities. Visualized upper abdomen is unremarkable.     Impression: Likely small left pleural effusion with associated left basilar opacity which may reflect infection or atelectasis. Electronically Signed: Dalton Figueroa  4/9/2023 5:57 PM EDT  Workstation ID: [id number]    CT Head Without Contrast Stroke Protocol    Result Date: 4/9/2023  CT HEAD WO CONTRAST STROKE PROTOCOL Date of Exam: 4/9/2023 4:01 PM EDT Indication: Neuro deficit, acute, stroke suspected Neuro deficit, acute stroke suspected. Comparison: None available. Technique: Axial CT images were obtained of the head without contrast administration.  Reconstructed coronal and sagittal images were also obtained. Automated exposure control and iterative construction methods were used. Scan Time:  4:05 PM Results discussed with stroke team at 4:17 PM. Findings: Parenchyma:No acute intraparenchymal hemorrhage. No loss of gray-white differentiation to suggest large territory infarct. Normal parenchymal volume. No substantial white matter disease. No midline shift or herniation. Ventricles and extra axial spaces:Normal caliber of ventricles and sulci. No extra axial fluid collection seen. Other:Orbits are grossly intact. Scattered paranasal sinus mucosal thickening. Mastoid air cells are clear. Calvarium is intact. No substantial intracranial atherosclerotic calcification.     Impression: No evidence of acute intracranial hemorrhage or large territorial infarct. Electronically Signed: Dalton Figueroa  4/9/2023 4:23 PM  EDT  Workstation ID: [id number]    CT Angiogram Head w AI Analysis of LVO    Result Date: 4/9/2023  CT ANGIOGRAM HEAD W AI ANALYSIS OF LVO, CT ANGIOGRAM NECK Date of Exam: 4/9/2023 4:14 PM EDT Indication: Neuro deficit, acute stroke suspected Neuro deficit, acute stroke suspected. Comparison: None available. Technique: CTA of the head was performed after the uneventful intravenous administration of 125 mL Isovue-370. Reconstructed coronal and sagittal images were also obtained. In addition, a 3-D volume rendered image was created for interpretation. Automated exposure control and iterative reconstruction methods were used. Findings: Anterior circulation: Internal common carotid arteries are patent. Anterior cerebral arteries are patent. Anterior communicating artery seen. The middle cerebral arteries are patent including M2 division. No evidence of aneurysm. Posterior circulation: Vertebral arteries are patent. Posterior inferior, anterior inferior and superior cerebellar arteries are seen. The basilar artery is patent. Fetal origin of the left posterior cerebral artery. Posterior cerebral arteries appear patent. Left posterior communicating artery appears patent. No evidence of aneurysm. Venous structures: Dural venous sinuses and central cerebral veins appear grossly patent. Internal jugular veins are grossly patent. Bones: Mild degenerative changes of the cervical spine. No high-grade canal or foraminal stenosis. No evidence of acute fracture. Soft tissues: No abnormal brain parenchymal enhancement. Aerodigestive tract is grossly patent. Mildly enlarged thyroid without dominant nodule. No suspicious adenopathy. Visualized thoracic vessels are grossly patent. Lung apices appear clear.     Impression: No evidence of flow-limiting stenosis or occlusion of the major vessels of the head and neck. Electronically Signed: Dalton Figueroa  4/9/2023 4:51 PM EDT  Workstation ID: [id number]    CT CEREBRAL PERFUSION WITH &  WITHOUT CONTRAST    Result Date: 4/9/2023  CT CEREBRAL PERFUSION W WO CONTRAST Date of Exam: 4/9/2023 4:14 PM EDT Indication: Neuro deficit, acute stroke suspected.  Comparison: None available. Technique: Axial CT images of the brain were obtained prior to and after the administration of 125 mL Isovue-370. Core blood volume, core blood flow, mean transit time, and Tmax images were obtained utilizing the Rapid software protocol. A limited CT angiogram of the head was also performed to measure the blood vessel density. The radiation dose reduction device was turned on for each scan per the ALARA (As Low as Reasonably Achievable) protocol. Findings: Symmetric and preserved CBV and CBF. No evidence of elevated T. Max. There is mildly elevated T-Max in the watershed regions consistent with mild borders on perfusion (volume 22 mL).     Impression: No CT perfusion evidence of infarct or ischemia. Electronically Signed: Dalton Figueroa  4/9/2023 4:40 PM EDT  Workstation ID: [id number]                  Plan for Follow-up of Pending Labs/Results:     Discharge Details        Discharge Medications      New Medications      Instructions Start Date   aspirin 81 MG chewable tablet   81 mg, Oral, Daily   Start Date: April 11, 2023     aspirin  MG tablet   325 mg, Oral, Daily   Start Date: May 3, 2023     atorvastatin 40 MG tablet  Commonly known as: Lipitor   40 mg, Oral, Nightly      clopidogrel 75 MG tablet  Commonly known as: Plavix   75 mg, Oral, Daily      co-enzyme Q-10 30 MG capsule   30 mg, Oral, 3 Times Daily      losartan 25 MG tablet  Commonly known as: Cozaar   25 mg, Oral, Daily             No Known Allergies      Discharge Disposition:  Home or Self Care    Diet:  Hospital:  Diet Order   Procedures   • Diet: Cardiac Diets; Healthy Heart (2-3 Na+); Texture: Regular Texture (IDDSI 7); Fluid Consistency: Thin (IDDSI 0)       Activity:  Activity Instructions     Activity as Tolerated            Restrictions or Other  Recommendations:  None        CODE STATUS:    Code Status and Medical Interventions:   Ordered at: 04/09/23 1812     Level Of Support Discussed With:    Patient     Code Status (Patient has no pulse and is not breathing):    CPR (Attempt to Resuscitate)     Medical Interventions (Patient has pulse or is breathing):    Full Support       No future appointments.              Samir Fan MD  04/10/23      Time Spent on Discharge:  I spent 28 minutes on this discharge activity which included: face-to-face encounter with the patient, reviewing the data in the system, coordination of the care with the nursing staff as well as consultants, documentation, and entering orders.

## 2023-04-10 NOTE — CONSULTS
"Diabetes Education  Assessment/Teaching    Patient Name:  Mal Magallanes  YOB: 1965  MRN: [id number]  Admit Date:  4/9/2023      Assessment Date:  4/10/2023  Flowsheet Row Most Recent Value   General Information     Referral From: MD reyez   Height 188 cm (74\")   Weight 115 kg (254 lb)   Weight Method Bed scale   Are you currently involved in an activity/exercise program?  No   Do you have high blood pressure? yes   Are you currently being treated for high blood pressure? yes   How would you rate your current health? fair   Is patient pregnant? n/a   Pregnancy Assessment    Diabetes History    What type of diabetes do you have? Pre-diabetes   Length of Diabetes Diagnosis Newly diagnosed <6 months   Current DM knowledge poor   Have you had diabetes education/teaching in the past? no   Do you test your blood sugar at home? no   Have you had low blood sugar? (<70mg/dl) no   Have you had high blood sugar? (>140mg/dl) no   Do you have any diabetes complications? none   Education Preferences    What areas of diabetes would you like to learn about? diet information, diabetes complications   Nutrition Information    Assessment Topics    Healthy Eating - Assessment Needs education   Being Active - Assessment Needs education   Taking Medication - Assessment N/A-unable to assess   Problem Solving - Assessment N/A-unable to assess   Reducing Risk - Assessment Needs education   Healthy Coping - Assessment N/A-unable to assess   Monitoring - Assessment Needs education   DM Goals           Patient educated on Pre-diabetes current A1c 6.4%, diabetes and the disease process, types of DM, diagnosis/A1C, monitoring, signs and symptoms, activity and exercise and how to prevent disease from progressing. Pt instructed to check blood glucose 1 to 2 times a week and provided blood glucose goals per ADA for prediabetes range. Pt provided information on how to obtain meter and testing supplies OTC, pt states \"he has a meter at " "home that him and his wife share with extra testing supplies.\" Pt. verbalized understanding and also completed a return demonstration on using the meter using Teach Back method. Pt advised to consult with PCP for further instructions, discuss A1C result, and POC. Education handouts provided ( Prediabetes information book [facility], [facility] prediabetes handout, and MyPlate Method handout), provided patient information on [facility] diabetes prevention program and pt. advised to call with any other questions or concerns.    Thank you,    Becca Milian RN, BSN, PCCN      Other Comments:         Electronically signed by:  Becca Vazquez RN  04/10/23 14:03 EDT  "

## 2023-04-10 NOTE — THERAPY EVALUATION
Acute Care - Speech Language Pathology Initial Evaluation  [facility]   Cognitive-Communication Evaluation       Patient Name: Mal Magallanes  : 1965  MRN: [id number]  Today's Date: 4/10/2023               Admit Date: 2023     Visit Dx:    ICD-10-CM ICD-9-CM   1. Cerebrovascular accident (CVA), unspecified mechanism  I63.9 434.91   2. Left-sided weakness  R53.1 728.87   3. Left facial numbness  R20.0 782.0     Patient Active Problem List   Diagnosis   • Cerebrovascular accident (CVA), unspecified mechanism   • Elevated glucose   • HTN (hypertension)   • HLD (hyperlipidemia)     Past Medical History:   Diagnosis Date   • Hyperlipidemia    • Hypertension      Past Surgical History:   Procedure Laterality Date   • HAND SURGERY      R hand       SLP Recommendation and Plan  SLP Diagnosis: functional speech/language skills, functional cognitive-linguistic skills (04/10/23 1015)           [facility] Criteria for Skilled Therapy Interventions Met: no problems identified which require skilled intervention (04/10/23 1015)  Anticipated Discharge Disposition (SLP): home (04/10/23 1015)                                   Plan of Care Reviewed With: patient, spouse, family (04/10/23 1135)      SLP EVALUATION (last 72 hours)     SLP SLC Evaluation     Row Name 04/10/23 1015                   Communication Assessment/Intervention    Document Type evaluation  -DV        Subjective Information no complaints  -DV        Patient Observations alert;cooperative  -DV        Patient/Family/Caregiver Comments/Observations family present  -DV        Patient Effort excellent  -DV        Symptoms Noted During/After Treatment none  -DV           General Information    Patient Profile Reviewed yes  -DV        Pertinent History Of Current Problem 56yo w/ L facial numbness and L leg weakness. CT negative for acute abnormality, MRI pending.  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  -DV        Prior Level of Function-Communication WFL  -DV        Plans/Goals Discussed with patient;family;agreed upon  -DV        Barriers to Rehab none identified  -DV        Patient's Goals for Discharge return to all previous roles/activities  -DV        Family Goals for Discharge family did not state  -DV           Pain    Additional Documentation Pain Scale: Numbers Pre/Post-Treatment (Group)  -DV           Pain Scale: Numbers Pre/Post-Treatment    Pretreatment Pain Rating 0/10 - no pain  -DV        Posttreatment Pain Rating 0/10 - no pain  -DV           Comprehension Assessment/Intervention    Comprehension Assessment/Intervention Auditory Comprehension  -DV           Auditory Comprehension Assessment/Intervention    Auditory Comprehension (Communication) WFL  -DV        Answers Questions (Communication) yes/no;WFL  -DV        Able to Follow Commands (Communication) 3-step;WFL  -DV        Narrative Discourse conversational level;WFL  -DV           Expression Assessment/Intervention    Expression Assessment/Intervention verbal expression  -DV           Verbal Expression Assessment/Intervention    Verbal Expression WFL  -DV        Spontaneous/Functional Words WFL  -DV        Sentence Formulation WFL  -DV        Conversational Discourse/Fluency WFL  -DV           Motor Speech Assessment/Intervention    Motor Speech Function WFL  -DV           Cursory Voice Assessment/Intervention    Quality and Resonance (Voice) WFL  -DV           Cognitive Assessment Intervention- SLP    Cognitive Function (Cognition) WFL  -DV        Orientation Status (Cognition) WFL  -DV        Memory (Cognitive) short-term;WFL  -DV        Attention (Cognitive) WFL  -DV        Thought Organization (Cognitive) concrete divergent;WFL  -DV        Reasoning (Cognitive) deductive;mental flexibility;WFL  -DV        Problem Solving (Cognitive) temporal;WFL  -DV         Executive Function (Cognition) WFL  -DV        Pragmatics (Communication) WFL  -DV           SLP Evaluation Clinical Impressions    SLP Diagnosis functional speech/language skills;functional cognitive-linguistic skills  -DV        SLC Criteria for Skilled Therapy Interventions Met no problems identified which require skilled intervention  -DV        Functional Impact no impact on function  -DV           Recommendations    Therapy Frequency (SLP SLC) evaluation only  -DV        Anticipated Discharge Disposition (SLP) home  -DV              User Key  (r) = Recorded By, (t) = Taken By, (c) = Cosigned By    Initials Name Effective Dates    Jasmyne Chaparro MS CCC-SLP 06/16/21 -                    EDUCATION  The patient has been educated in the following areas:     Cognitive Impairment Communication Impairment.                      Time Calculation:      Time Calculation- SLP     Row Name 04/10/23 1136             Time Calculation- SLP    SLP Start Time 1015  -DV      SLP Received On 04/10/23  -DV         Untimed Charges    SLP Eval/Re-eval  ST Eval Speech and Production w/ Language - 74952  -DV      [id number]-GY Eval Speech and Production w/ Language Minutes 35  -DV         Total Minutes    Untimed Charges Total Minutes 35  -DV       Total Minutes 35  -DV            User Key  (r) = Recorded By, (t) = Taken By, (c) = Cosigned By    Initials Name Provider Type    DV Jasmyne Hancock MS CCC-SLP Speech and Language Pathologist                Therapy Charges for Today     Code Description Service Date Service Provider Modifiers Qty    [id number] HC ST EVAL SPEECH AND PROD W LANG  2 4/10/2023 Jasmyne Hancock MS CCC-SLP GN 1                     Jasmyne Hancock MS CCC-MARTIN  4/10/2023

## 2023-04-10 NOTE — THERAPY DISCHARGE NOTE
Patient Name: Mal Magallanes  : 1965    MRN: [id number]                              Today's Date: 4/10/2023       Admit Date: 2023    Visit Dx:     ICD-10-CM ICD-9-CM   1. Cerebrovascular accident (CVA), unspecified mechanism  I63.9 434.91   2. Left-sided weakness  R53.1 728.87   3. Left facial numbness  R20.0 782.0     Patient Active Problem List   Diagnosis   • Cerebrovascular accident (CVA), unspecified mechanism   • Elevated glucose   • HTN (hypertension)   • HLD (hyperlipidemia)     Past Medical History:   Diagnosis Date   • Hyperlipidemia    • Hypertension      Past Surgical History:   Procedure Laterality Date   • HAND SURGERY      R hand      General Information     Row Name 04/10/23 1414          Physical Therapy Time and Intention    Document Type therapy note (daily note)  -     Mode of Treatment physical therapy  -     Row Name 04/10/23 141          General Information    Patient Profile Reviewed yes  -     Prior Level of Function independent:;all household mobility;community mobility;gait;transfer;bed mobility  no AD at baseline, works on house building for occupation  -     Existing Precautions/Restrictions no known precautions/restrictions  -     Row Name 04/10/23 1414          Home Main Entrance    Number of Stairs, Main Entrance none  -HM     Stair Railings, Main Entrance none  -HM     Row Name 04/10/23 1414          Stairs Within Home, Primary    Stairs, Within Home, Primary all needs met on main level  -     Number of Stairs, Within Home, Primary twelve  -HM     Stair Railings, Within Home, Primary none  -HM     Row Name 04/10/23 1414          Cognition    Orientation Status (Cognition) oriented x 4  -HM           User Key  (r) = Recorded By, (t) = Taken By, (c) = Cosigned By    Initials Name Provider Type     Valeri Priest PT Physical Therapist               Mobility     Row Name 04/10/23 1416          Sit-Stand Transfer    Sit-Stand [place] (Transfers)  independent  -     Assistive Device (Sit-Stand Transfers) other (see comments)  no AD  -HM     Row Name 04/10/23 1416          Gait/Stairs (Locomotion)    [place] Level (Gait) standby assist  -     Assistive Device (Gait) other (see comments)  no AD  -HM     [place] Level (Stairs) not tested  -     Comment, (Gait/Stairs) pt demonstrated one episode of increased lateral sway towards L initially with ambulation in hallway that pt self corrected with no assist. Pt demonstrated good safety awareness throughout. Pt with no LOB with dynamic gait activities.  -           User Key  (r) = Recorded By, (t) = Taken By, (c) = Cosigned By    Initials Name Provider Type     Valeri Priest PT Physical Therapist               Obj/Interventions     Row Name 04/10/23 1420          Range of Motion Comprehensive    General Range of Motion bilateral lower extremity ROM WFL  -     Row Name 04/10/23 1420          Strength Comprehensive (MMT)    General Manual Muscle Testing (MMT) Assessment no strength deficits identified  -     Comment, General Manual Muscle Testing (MMT) Assessment BLE 5/5  -     Row Name 04/10/23 1420          Motor Skills    Motor Skills coordination;functional endurance  -     Coordination WFL;gross motor deficit;heel to shin  -     Functional Endurance no onset of SOA with mobility  -     Row Name 04/10/23 1420          Balance    Balance Assessment sitting static balance;sitting dynamic balance;sit to stand dynamic balance;standing static balance;standing dynamic balance  -     Static Sitting Balance independent  -     Dynamic Sitting Balance independent  -     Position, Sitting Balance sitting in chair;unsupported  -HM     Sit to Stand Dynamic Balance standby assist  -     Static Standing Balance standby assist  -     Dynamic Standing Balance standby assist  -     Position/Device Used, Standing Balance unsupported  -HM     Balance Interventions  standing;dynamic;combined head and eye movements;occupation based/functional task;tandem gait  -     Comment, Balance pt initially with one slight increased postural sway to L in beginning of ambulation in hallway, no LOB with changes in gait speed, navigating environment, tandem gait, and combined head and eye movements. good safety awareness throughout  -     Row Name 04/10/23 1420          Sensory Assessment (Somatosensory)    Sensory Assessment (Somatosensory) LE sensation intact  -           User Key  (r) = Recorded By, (t) = Taken By, (c) = Cosigned By    Initials Name Provider Type     Valeri Priest PT Physical Therapist               Goals/Plan     Row Name 04/10/23 1425          Transfer Goal 1 (PT)    Activity/Assistive Device (Transfer Goal 1, PT) sit-to-stand/stand-to-sit  -HM     [place] Level/Cues Needed (Transfer Goal 1, PT) standby assist  -HM     Time Frame (Transfer Goal 1, PT) long term goal (LTG);10 days  -     Progress/Outcome (Transfer Goal 1, PT) goal met  -     Row Name 04/10/23 1425          Gait Training Goal 1 (PT)    Activity/Assistive Device (Gait Training Goal 1, PT) gait (walking locomotion)  -HM     [place] Level (Gait Training Goal 1, PT) standby assist  -HM     Distance (Gait Training Goal 1, PT) 450  -HM     Time Frame (Gait Training Goal 1, PT) long term goal (LTG);10 days  -HM     Progress/Outcome (Gait Training Goal 1, PT) goal met  -           User Key  (r) = Recorded By, (t) = Taken By, (c) = Cosigned By    Initials Name Provider Type     Valeri Priest PT Physical Therapist               Clinical Impression     Row Name 04/10/23 1422          Pain    Pretreatment Pain Rating 0/10 - no pain  -     Posttreatment Pain Rating 0/10 - no pain  -     Row Name 04/10/23 1422          Plan of Care Review    Plan of Care Reviewed With patient  -     Progress no change  -     Outcome Evaluation PT eval completed. Pt ambulated 500 feet with SBA and  no AD with one episode of increased lateral sway to L when initially ambulating in hallway and pt able to correct with no assist. Pt with no LOB with dynamic balance interventions. Pt is currently at baseline function for mobility and no skilled PT interventions warranted at this time. PT to sign off and pt agreeable. d/c rec home with assist.  -     Row Name 04/10/23 1422          Therapy Assessment/Plan (PT)    Criteria for Skilled Interventions Met (PT) no;no problems identified which require skilled intervention  -     Therapy Frequency (PT) evaluation only  -     Row Name 04/10/23 1422          Vital Signs    Pre Systolic BP Rehab 141  -HM     Pre Treatment Diastolic BP 95  -HM     Post Systolic BP Rehab 131  -HM     Post Treatment Diastolic    -HM     Posttreatment Heart Rate (beats/min) 70  -HM     O2 Delivery Pre Treatment room air  -HM     O2 Delivery Intra Treatment room air  -HM     O2 Delivery Post Treatment room air  -HM     Pre Patient Position Sitting  -HM     Intra Patient Position Standing  -HM     Post Patient Position Sitting  -HM     Row Name 04/10/23 1422          Positioning and Restraints    Pre-Treatment Position sitting in chair/recliner  -HM     Post Treatment Position chair  -HM     In Chair notified nsg;sitting;call light within reach;encouraged to call for assist  -HM           User Key  (r) = Recorded By, (t) = Taken By, (c) = Cosigned By    Initials Name Provider Type    Valeri Ibanez, PT Physical Therapist               Outcome Measures     Row Name 04/10/23 1426          How much help from another person do you currently need...    Turning from your back to your side while in flat bed without using bedrails? 4  -HM     Moving from lying on back to sitting on the side of a flat bed without bedrails? 4  -HM     Moving to and from a bed to a chair (including a wheelchair)? 4  -HM     Standing up from a chair using your arms (e.g., wheelchair, bedside chair)? 4  -HM      Climbing 3-5 steps with a railing? 4  -     To walk in hospital room? 4  -     AM-PAC 6 Clicks Score (PT) 24  -     Highest level of mobility 8 --> Walked 250 feet or more  -     Row Name 04/10/23 1426 04/10/23 1034       Modified Ingrid Scale    Pre-Stroke Modified Ingrid Scale 6 - Unable to determine (UTD) from the medical record documentation  - 0 - No Symptoms at all.  -J    Modified [place] Scale 1 - No significant disability despite symptoms.  Able to carry out all usual duties and activities.  - 1 - No significant disability despite symptoms.  Able to carry out all usual duties and activities.  -J    Row Name 04/10/23 1426 04/10/23 1034       Functional Assessment    Outcome Measure Options AM-PAC 6 Clicks Basic Mobility (PT);Modified [place]  - AM-PAC 6 Clicks Daily Activity (OT);Modified Ingrid  -          User Key  (r) = Recorded By, (t) = Taken By, (c) = Cosigned By    Initials Name Provider Type    Jannette Haider, OT Occupational Therapist     Valeri Priest, PT Physical Therapist              Physical Therapy Education     Title: PT OT SLP Therapies (In Progress)     Topic: Physical Therapy (In Progress)     Point: Mobility training (Done)     Learning Progress Summary           Patient Acceptance, E,TB, VU,DU by  at 4/10/2023 1427    Comment: educated on pacing activity for safety                   Point: Home exercise program (Not Started)     Learner Progress:  Not documented in this visit.          Point: Body mechanics (Done)     Learning Progress Summary           Patient Acceptance, E,TB, VU,DU by  at 4/10/2023 1427    Comment: educated on pacing activity for safety                   Point: Precautions (Done)     Learning Progress Summary           Patient Acceptance, E,TB, VU,DU by  at 4/10/2023 1427    Comment: educated on pacing activity for safety                               User Key     Initials Effective Dates Name Provider Type Discipline     09/22/22 -   Valeri Priest, PT Physical Therapist PT              PT Recommendation and Plan     Plan of Care Reviewed With: patient  Progress: no change  Outcome Evaluation: PT eval completed. Pt ambulated 500 feet with SBA and no AD with one episode of increased lateral sway to L when initially ambulating in hallway and pt able to correct with no assist. Pt with no LOB with dynamic balance interventions. Pt is currently at baseline function for mobility and no skilled PT interventions warranted at this time. PT to sign off and pt agreeable. d/c rec home with assist.     Time Calculation:    PT Charges     Row Name 04/10/23 1428             Time Calculation    Start Time 1345  -HM      PT Received On 04/10/23  -HM         Untimed Charges    PT Eval/Re-eval Minutes 46  -HM         Total Minutes    Untimed Charges Total Minutes 46  -HM       Total Minutes 46  -HM            User Key  (r) = Recorded By, (t) = Taken By, (c) = Cosigned By    Initials Name Provider Type     Valeri Priest, FARIBA Physical Therapist              Therapy Charges for Today     Code Description Service Date Service Provider Modifiers Qty    [id number]  PT EVAL LOW COMPLEXITY 4 4/10/2023 Valeri Priest, PT GP 1          PT G-Codes  Outcome Measure Options: AM-PAC 6 Clicks Basic Mobility (PT), Modified Ingrid  AM-PAC 6 Clicks Score (PT): 24  AM-PAC 6 Clicks Score (OT): 24  Modified Ingrid Scale: 1 - No significant disability despite symptoms.  Able to carry out all usual duties and activities.    PT Discharge Summary  Anticipated Discharge Disposition (PT): home with assist  Reason for Discharge: At baseline function  Outcomes Achieved: Able to achieve all goals within established timeline    Valeri Priest PT  4/10/2023

## 2023-04-10 NOTE — CASE MANAGEMENT/SOCIAL WORK
Discharge Planning Assessment  [facility]     Patient Name: Mal Magallanes  MRN: [id number]  Today's Date: 4/10/2023    Admit Date: 4/9/2023    Plan: Home   Discharge Needs Assessment     Row Name 04/10/23 5056       Discharge Needs Assessment    Equipment Currently Used at Home --  He has a shower chair, wheelchair, walker and cane at home but states he does not use any of the equipment.    Row Name 04/10/23 133       Living Environment    People in Home spouse    Current Living Arrangements home    Potentially Unsafe Housing Conditions none    Primary Care Provided by self    Provides Primary Care For no one    Family Caregiver if Needed spouse    Quality of Family Relationships supportive    Able to Return to Prior Arrangements yes       Resource/Environmental Concerns    Resource/Environmental Concerns none    Transportation Concerns none       Transition Planning    Patient/Family Anticipates Transition to home    Patient/Family Anticipated Services at Transition none    Transportation Anticipated family or friend will provide       Discharge Needs Assessment    Readmission Within the Last 30 Days no previous admission in last 30 days    Equipment Currently Used at Home none    Concerns to be Addressed no discharge needs identified    Anticipated Changes Related to Illness none    Equipment Needed After Discharge none               Discharge Plan     Row Name 04/10/23 6218       Plan    Plan Home    Patient/Family in Agreement with Plan yes    Plan Comments Spoke with Mr. Magallanes at the bedside. Mr. Magallanes lives in [facility] with his spouse. He is independent with ADL's and does not use oxygen or home health. He is not interested in rehab. He has a shower chair, wheelchair, walker, and cane at home. Patient states he does not use any of the DME. His wife, Deann, is his POA and he does have a living will. His PCP is Sherry Herrera and he has [employer] insurance. Patient denies any needs at this time. TYLER  will continue to follow.    Final Discharge Disposition Code 01 - home or self-care              Continued Care and Services - Admitted Since 4/9/2023    Coordination has not been started for this encounter.       Expected Discharge Date and Time     Expected Discharge Date Expected Discharge Time    Apr 11, 2023          Demographic Summary     Row Name 04/10/23 1334       General Information    Admission Type observation    Arrived From home    Referral Source admission list    Reason for Consult discharge planning    Preferred Language English       Contact Information    Permission Granted to Share Info With                Functional Status     Row Name 04/10/23 1334       Functional Status    Usual Activity Tolerance excellent    Current Activity Tolerance --  See PT notes       Mental Status    General Appearance WDL WDL                         Italia Mccarthy, RN

## 2023-04-10 NOTE — NURSING NOTE
WOC consult for left great toe.    1x1cm scab/callus with dry, shiny fibrin/scab covered by callus. No erythema/drainage/warmth or pain.    Patient says he is not diabetic yet his A1C is elevated. Stated he recently wore steel toe shoes that caused this spot.    Recommend painting with betadine daily to keep clean and dry.    Upon d/c:  Recommend outpatient podiatry appointment where they can shave off callus and follow wound closely.. Recommend getting custom orthotics especially if they are steel toed and wide toe box shoes. Assess feet daily for wounds, can use adhesive felt padding as offloading dressing. Monitor for infection of this wound. Diabetes educator has been consulted.        Will sign off.

## 2023-04-10 NOTE — PLAN OF CARE
Goal Outcome Evaluation:  Plan of Care Reviewed With: patient        Progress: no change  Outcome Evaluation: PT eval completed. Pt ambulated 500 feet with SBA and no AD with one episode of increased lateral sway to L when initially ambulating in hallway and pt able to correct with no assist. Pt with no LOB with dynamic balance interventions. Pt is currently at baseline function for mobility and no skilled PT interventions warranted at this time. PT to sign off and pt agreeable. d/c rec home with assist.

## 2023-04-10 NOTE — PLAN OF CARE
Goal Outcome Evaluation:  Plan of Care Reviewed With: patient, spouse, family        Progress: improving  Outcome Evaluation: OT evaluation completed. Pt presents with grossly baseline occupational performance specifically I in gross ADLs and related t/fs and mobility w/ exception of mobility with spv for close monitoring of s/s and pt performance. No overt LOB noted yet mild postural sway during initial stepping following exit from bed with report of increased tenderness at R foot and wound at L toe, no AD used. RN aware. Pt self corrected and no further balance issues noted. Pt demonstrated functional strength, ROM at BUEs, no coordination, sensation or visual deficits and presented with activity tolerance sufficient for completion of occupational tasks. Educated pt and family on recognizing s/s CVA and seeking timely care with verbalized understanding. No further IPOT POC warranted, recommend home w/ A given no further concerns w/ mobility and return home with A at d/c when medically ready.

## 2023-04-10 NOTE — THERAPY DISCHARGE NOTE
Acute Care - Occupational Therapy Discharge  [facility]    Patient Name: Mal Magallanes  : 1965    MRN: [id number]                              Today's Date: 4/10/2023       Admit Date: 2023    Visit Dx:     ICD-10-CM ICD-9-CM   1. Cerebrovascular accident (CVA), unspecified mechanism  I63.9 434.91   2. Left-sided weakness  R53.1 728.87   3. Left facial numbness  R20.0 782.0     Patient Active Problem List   Diagnosis   • Cerebrovascular accident (CVA), unspecified mechanism   • Elevated glucose   • HTN (hypertension)   • HLD (hyperlipidemia)     Past Medical History:   Diagnosis Date   • Hyperlipidemia    • Hypertension      Past Surgical History:   Procedure Laterality Date   • HAND SURGERY      R hand      General Information     Row Name 04/10/23 0954          OT Time and Intention    Document Type discharge evaluation/summary  -JY     Mode of Treatment occupational therapy;individual therapy  -JY     Row Name 04/10/23 0954          General Information    Patient Profile Reviewed yes  -JY     Prior Level of Function independent:;all household mobility;community mobility;gait;transfer;bed mobility;ADL's;feeding;grooming;dressing;bathing;driving;using stairs;work;yard work;dependent:;home management;cooking;cleaning  I in ADLs, related t/fs, mobility w/o use of AD, Janae driving, working in house building; dependent on spouse for home mgmt, cooking, laundry d/t convenience vs actual need for A; denies any falls  -JY     Existing Precautions/Restrictions no known precautions/restrictions  -JY     Barriers to Rehab none identified  -JY     Row Name 04/10/23 0954          Occupational Profile    Environmental Supports and Barriers (Occupational Profile) walk in shower w/ threshold, standard toilet height, DME: none used, multiple pieces in storage if needed  -JY     Row Name 04/10/23 0954          Living Environment    People in Home spouse;other (see comments)  able to assist as needed  -JY     Row Name  04/10/23 0954          Home Main Entrance    Number of Stairs, Main Entrance none  -JY     Stair Railings, Main Entrance none  -JY     Row Name 04/10/23 0954          Stairs Within Home, Primary    Stairs, Within Home, Primary flight upstairs however pt does not routinely use; has bedroom, bathroom and kitchen on main level  -JY     Number of Stairs, Within Home, Primary twelve;other (see comments)  flight upstairs  -JY     Stair Railings, Within Home, Primary none  -JY     Row Name 04/10/23 0954          Cognition    Orientation Status (Cognition) oriented x 4  -JY     Row Name 04/10/23 0954          Safety Issues, Functional Mobility    Safety Issues Affecting Function (Mobility) safety precaution awareness  -JY     Impairments Affecting Function (Mobility) balance  -JY     Comment, Safety Issues/Impairments (Mobility) pt alert and able to follow commands; one episode of postural sway upon initially getting OOB; reports similar response at baseline w/ report of increased tenderness at R foot upon initial stepping; cued pt on pacing self and avoiding abrupt changes in position for more fluid turning to decrease risk of LOB  -JY           User Key  (r) = Recorded By, (t) = Taken By, (c) = Cosigned By    Initials Name Provider Type    Jannette Haider, OT Occupational Therapist               Mobility/ADL's     Row Name 04/10/23 1000          Bed Mobility    Bed Mobility supine-sit;scooting/bridging  -JY     Scooting/Bridging [place] (Bed Mobility) independent  -JY     Supine-Sit [place] (Bed Mobility) independent  -JY     Assistive Device (Bed Mobility) head of bed elevated  -JSONU     Comment, (Bed Mobility) pt had HOB elevated throughout bed mobility as set for comfort however did not appear to need elevation to ascend into sitting, did not grossly use bed rails present for use PRN; demonstrated forward flexion and advancement of LEs off edge w/o any physical A and denied any dizziness or feeling LH upon  sitting EOB  -JY     Row Name 04/10/23 1000          Transfers    Transfers sit-stand transfer;stand-sit transfer  -J     Comment, (Transfers) pt demonstrated good hand placement and safety awareness in fxl transfers, grossly I w/o AD used; reviewed pacing self kelsea upon initial standind and stepping and in turning/ pivoting to demonstrate controlled movement to avoid postural sway with transition  -JY     Row Name 04/10/23 1000          Sit-Stand Transfer    Sit-Stand [place] (Transfers) independent  -     Assistive Device (Sit-Stand Transfers) other (see comments)  no AD used  -JY     Row Name 04/10/23 1000          Stand-Sit Transfer    Stand-Sit [place] (Transfers) independent  -     Assistive Device (Stand-Sit Transfers) other (see comments)  no AD used  -JY     Row Name 04/10/23 1000          Functional Mobility    Functional Mobility- Ind. Level supervision required  -     Functional Mobility- Device other (see comments)  no AD used  -     Functional Mobility- Safety Issues balance decreased during turns  -     Functional Mobility- Comment refer to PT for specifics however during in room ADL related mobility pt grossly req'd spv for close monitoring of pt response, cues for paced, controlled transitions to promote more safe and fluid pivoting and turning after observation of 1 postural sway instance when turning; report of increased tenderness at R foot (denies numbness or tingling) and wound at L great toe; no other LOB noted  -SONU     Row Name 04/10/23 1000          Activities of Daily Living    BADL Assessment/Intervention upper body dressing;lower body dressing;grooming  -JY     Row Name 04/10/23 1000          Upper Body Dressing Assessment/Training    [place] Level (Upper Body Dressing) doff;don;marce/piero;independent  -     Position (Upper Body Dressing) edge of bed sitting  -JY     Row Name 04/10/23 1000          Lower Body Dressing Assessment/Training    [place] Level  (Lower Body Dressing) doff;don;socks;independent  -JY     Position (Lower Body Dressing) edge of bed sitting  -JY     Comment, (Lower Body Dressing) pt able to demonstrate distal reach away from [place] to d/d socks w/o any physical A; no balance concerns with sitting dynamic challenges  -JY     Row Name 04/10/23 1000          Grooming Assessment/Training    [place] Level (Grooming) wash face, hands;independent  -JY     Position (Grooming) supported sitting  -JY           User Key  (r) = Recorded By, (t) = Taken By, (c) = Cosigned By    Initials Name Provider Type    Jannette Haider, OT Occupational Therapist               Obj/Interventions     Row Name 04/10/23 1015          Sensory Assessment (Somatosensory)    Sensory Assessment (Somatosensory) bilateral UE;sensation intact  -     Bilateral UE Sensory Assessment general sensation;light touch awareness;intact  -     Sensory Assessment denies any numbness or tingling and able to recognize all LT stimuli as intact and symmetrical at BUEs  -     Row Name 04/10/23 1015          Vision Assessment/Intervention    Visual Impairment/Limitations corrective lenses for reading  -     Vision Assessment Comment denies any blurred, incomplete or double vision, able to track L &R, up/down and responds to divergence and convergence and identifies L & R peripheral input symmetrically  -     Row Name 04/10/23 1015          Range of Motion Comprehensive    General Range of Motion bilateral upper extremity ROM WNL  -[facility] Name 04/10/23 1015          Strength Comprehensive (MMT)    General Manual Muscle Testing (MMT) Assessment no strength deficits identified  -     Comment, General Manual Muscle Testing (MMT) Assessment 5/5 MMS throughout BUEs, no asymmetry or motor drift noted  -     Row Name 04/10/23 1015          Motor Skills    Motor Skills coordination;functional endurance  -     Coordination bilateral;upper extremity;finger to nose;other (see  comments);lower extremity;heel to shin;WFL  finger thumb opposition WFL  -JY     Functional Endurance presents with activity tolerance sufficient for ADLs and related mobility, SPO2 > 90% throughout on RA  -JY     Row Name 04/10/23 1015          Balance    Balance Assessment sitting static balance;sitting dynamic balance;standing static balance;standing dynamic balance  -JY     Static Sitting Balance independent  -JY     Dynamic Sitting Balance independent;other (see comments)  LBD and pre t/f skills  -JY     Position, Sitting Balance unsupported;sitting edge of bed;sitting in chair  -JY     Static Standing Balance independent  -JY     Dynamic Standing Balance supervision;verbal cues  -JY     Position/Device Used, Standing Balance unsupported  -JY     Balance Interventions sitting;standing;static;dynamic;sit to stand;supported;occupation based/functional task  -JY     Comment, Balance no overt LOB during seated or standing tasks; demonstrated one mild postural sway episode when turning soon after getting OOB and initial stepping; pt reports increased tenderness at R foot and wound at L great toe; self correction in postural sway and no other instances noted  -RASHMI           User Key  (r) = Recorded By, (t) = Taken By, (c) = Cosigned By    Initials Name Provider Type    Jannette Haider, LIVAN Occupational Therapist               Goals/Plan     Row Name 04/10/23 1032          Problem Specific Goal 1 (OT)    Problem Specific Goal 1 (OT) Pt to verbalize/demonstrate understanding/compliance in recognizing s/s of CVA and seeking timely care as needed and further home safety to promote improved safety upon return home.  -RASHMI     Time Frame (Problem Specific Goal 1, OT) short term goal (STG);by discharge  -RASHMI     Progress/Outcome (Problem Specific Goal 1, OT) goal met  -RASHMI     Row Name 04/10/23 1032          Therapy Assessment/Plan (OT)    Planned Therapy Interventions (OT) patient/caregiver education/training  -RASHMI            User Key  (r) = Recorded By, (t) = Taken By, (c) = Cosigned By    Initials Name Provider Type    Jannette Haider, OT Occupational Therapist               Clinical Impression     Row Name 04/10/23 1020          Pain Assessment    Pretreatment Pain Rating 0/10 - no pain  -JY     Posttreatment Pain Rating 0/10 - no pain  -JY     Pre/Posttreatment Pain Comment denies any pain and tolerated all OT interventions  -JY     Pain Intervention(s) Repositioned;Medication (See MAR);Ambulation/increased activity;Rest  -JY     Row Name 04/10/23 1020          Plan of Care Review    Plan of Care Reviewed With patient;spouse;family  -JY     Progress improving  -JY     Outcome Evaluation OT evaluation completed. Pt presents with grossly baseline occupational performance specifically I in gross ADLs and related t/fs and mobility w/ exception of mobility with spv for close monitoring of s/s and pt performance. No overt LOB noted yet mild postural sway during initial stepping following exit from bed with report of increased tenderness at R foot and wound at L toe, no AD used. RN aware. Pt self corrected and no further balance issues noted. Pt demonstrated functional strength, ROM at BUEs, no coordination, sensation or visual deficits and presented with activity tolerance sufficient for completion of occupational tasks. Educated pt and family on recognizing s/s CVA and seeking timely care with verbalized understanding. No further IPOT POC warranted, recommend home w/ A given no further concerns w/ mobility and return home with A at d/c when medically ready.  -JY     Row Name 04/10/23 1020          Therapy Assessment/Plan (OT)    Patient/Family Therapy Goal Statement (OT) to maximize I in ADLs, related t/fs, mobility, return to OF  -JY     Rehab Potential (OT) good, to achieve stated therapy goals  -JY     Criteria for Skilled Therapeutic Interventions Met (OT) yes;skilled treatment is necessary  -JY     Therapy Frequency (OT)  evaluation only  -RASHMI     Row Name 04/10/23 1020          Therapy Plan Review/Discharge Plan (OT)    Anticipated Discharge Disposition (OT) home with assist  -RASHMI     Row Name 04/10/23 1020          Vital Signs    Pre Systolic BP Rehab 147  -JY     Pre Treatment Diastolic BP 87  -JY     Post Systolic BP Rehab 157  -JY     Post Treatment Diastolic    -JY     Pretreatment Heart Rate (beats/min) 64  -JY     Posttreatment Heart Rate (beats/min) 66  -JY     Pre SpO2 (%) 97  -JY     O2 Delivery Pre Treatment room air  -JY     O2 Delivery Intra Treatment room air  -JY     Post SpO2 (%) 96  -JY     O2 Delivery Post Treatment room air  -JY     Pre Patient Position Supine  -JY     Intra Patient Position Standing  -JY     Post Patient Position Sitting  -JY     Row Name 04/10/23 1020          Positioning and Restraints    Pre-Treatment Position in bed  -JY     Post Treatment Position chair  -JY     In Chair notified nsg;call light within reach;encouraged to call for assist;sitting;with family/caregiver  RN indicated no chair alarm needed, pt denied SCDs thus educated on ankle pumps  -JY           User Key  (r) = Recorded By, (t) = Taken By, (c) = Cosigned By    Initials Name Provider Type    Jannette Haider, LIVAN Occupational Therapist               Outcome Measures     Row Name 04/10/23 1034          How much help from another is currently needed...    Putting on and taking off regular lower body clothing? 4  -JY     Bathing (including washing, rinsing, and drying) 4  -JY     Toileting (which includes using toilet bed pan or urinal) 4  -JY     Putting on and taking off regular upper body clothing 4  -JY     Taking care of personal grooming (such as brushing teeth) 4  -JY     Eating meals 4  -JY     AM-PAC 6 Clicks Score (OT) 24  -JY     Row Name 04/10/23 1034          Modified Ingrid Scale    Pre-Stroke Modified Ingrid Scale 0 - No Symptoms at all.  -JY     Modified [place] Scale 1 - No significant disability despite  symptoms.  Able to carry out all usual duties and activities.  -RASHMI     Row Name 04/10/23 1034          Functional Assessment    Outcome Measure Options AM-PAC 6 Clicks Daily Activity (OT);Modified Ingrid  -RASHMI           User Key  (r) = Recorded By, (t) = Taken By, (c) = Cosigned By    Initials Name Provider Type    Jannette Haider OT Occupational Therapist              Occupational Therapy Education     Title: PT OT SLP Therapies (In Progress)     Topic: Occupational Therapy (In Progress)     Point: ADL training (Done)     Description:   Instruct learner(s) on proper safety adaptation and remediation techniques during self care or transfers.   Instruct in proper use of assistive devices.              Learning Progress Summary           Patient Acceptance, E,D, VU,DU by RASHMI at 4/10/2023 0913   Family Acceptance, E,D, VU,DU by RASHMI at 4/10/2023 0913                   Point: Home exercise program (Not Started)     Description:   Instruct learner(s) on appropriate technique for monitoring, assisting and/or progressing therapeutic exercises/activities.              Learner Progress:  Not documented in this visit.          Point: Precautions (Done)     Description:   Instruct learner(s) on prescribed precautions during self-care and functional transfers.              Learning Progress Summary           Patient Acceptance, E,D, VU,DU by RASHMI at 4/10/2023 0913   Family Acceptance, E,D, VU,DU by RASHMI at 4/10/2023 0913                   Point: Body mechanics (Done)     Description:   Instruct learner(s) on proper positioning and spine alignment during self-care, functional mobility activities and/or exercises.              Learning Progress Summary           Patient Acceptance, E,D, VU,DU by RASHMI at 4/10/2023 0913   Family Acceptance, E,D, VU,DU by RASHMI at 4/10/2023 0913                               User Key     Initials Effective Dates Name Provider Type Discipline    RASHMI 06/16/21 -  Jannette Magallanes OT Occupational Therapist OT               OT Recommendation and Plan  Planned Therapy Interventions (OT): patient/caregiver education/training  Therapy Frequency (OT): evaluation only  Plan of Care Review  Plan of Care Reviewed With: patient, spouse, family  Progress: improving  Outcome Evaluation: OT evaluation completed. Pt presents with grossly baseline occupational performance specifically I in gross ADLs and related t/fs and mobility w/ exception of mobility with spv for close monitoring of s/s and pt performance. No overt LOB noted yet mild postural sway during initial stepping following exit from bed with report of increased tenderness at R foot and wound at L toe, no AD used. RN aware. Pt self corrected and no further balance issues noted. Pt demonstrated functional strength, ROM at BUEs, no coordination, sensation or visual deficits and presented with activity tolerance sufficient for completion of occupational tasks. Educated pt and family on recognizing s/s CVA and seeking timely care with verbalized understanding. No further IPOT POC warranted, recommend home w/ A given no further concerns w/ mobility and return home with A at d/c when medically ready.  Plan of Care Reviewed With: patient, spouse, family  Outcome Evaluation: OT evaluation completed. Pt presents with grossly baseline occupational performance specifically I in gross ADLs and related t/fs and mobility w/ exception of mobility with spv for close monitoring of s/s and pt performance. No overt LOB noted yet mild postural sway during initial stepping following exit from bed with report of increased tenderness at R foot and wound at L toe, no AD used. RN aware. Pt self corrected and no further balance issues noted. Pt demonstrated functional strength, ROM at BUEs, no coordination, sensation or visual deficits and presented with activity tolerance sufficient for completion of occupational tasks. Educated pt and family on recognizing s/s CVA and seeking timely care with verbalized  understanding. No further IPOT POC warranted, recommend home w/ A given no further concerns w/ mobility and return home with A at d/c when medically ready.     Time Calculation:    Time Calculation- OT     Row Name 04/10/23 1036             Time Calculation- OT    OT Start Time 0913  -JY      OT Received On 04/10/23  -JY         Timed Charges    60833 - OT Therapeutic Activity Minutes 9  -JY         Untimed Charges    OT Eval/Re-eval Minutes 49  -JY         Total Minutes    Timed Charges Total Minutes 9  -JY      Untimed Charges Total Minutes 49  -JY       Total Minutes 58  -JY            User Key  (r) = Recorded By, (t) = Taken By, (c) = Cosigned By    Initials Name Provider Type    Jannette Haider OT Occupational Therapist              Therapy Charges for Today     Code Description Service Date Service Provider Modifiers Qty    [id number]  OT THERAPEUTIC ACT EA 15 MIN 4/10/2023 Jannette Magallanes OT GO 1    [id number]  OT EVAL LOW COMPLEXITY 4 4/10/2023 Jannette Magallanes OT GO 1             OT Discharge Summary  Anticipated Discharge Disposition (OT): home with assist  Reason for Discharge: All goals achieved, Independent, At baseline function  Outcomes Achieved: Able to achieve all goals within established timeline, Refer to plan of care for updates on goals achieved  Discharge Destination: Home with assist    Jannette Magallanes OT  4/10/2023

## 2023-04-10 NOTE — PLAN OF CARE
Problem: Adult Inpatient Plan of Care  Goal: Plan of Care Review  Outcome: Ongoing, Progressing  Flowsheets (Taken 4/10/2023 1134)  Plan of Care Reviewed With:   patient   spouse   family   Goal Outcome Evaluation:  Plan of Care Reviewed With: patient, spouse, family            SLP evaluation completed. Will sign-off. Please see note for further details and recommendations.

## 2023-04-10 NOTE — PROGRESS NOTES
Neurology Note    Patient:  Mal Magallanes    YOB: 1965    REFERRING PHYSICIAN:  Dr. Fan    CHIEF COMPLAINT:    Left-sided facial numbness and leg weakness    HISTORY OF PRESENT ILLNESS:   The patient reports feeling back to baseline, able to eat, get up w/o help, vitals stable.     Past Medical History:  Past Medical History:   Diagnosis Date   • Hyperlipidemia    • Hypertension        Past Surgical History:  Past Surgical History:   Procedure Laterality Date   • HAND SURGERY      R hand       Social History:   Social History     Socioeconomic History   • Marital status:    Tobacco Use   • Smoking status: Never     Passive exposure: Never   • Smokeless tobacco: Never   Vaping Use   • Vaping Use: Never used   Substance and Sexual Activity   • Alcohol use: Not Currently   • Drug use: Never   • Sexual activity: Defer        Family History:   Family History   Problem Relation Age of Onset   • Heart disease Mother    • Hypertension Mother    • Hyperlipidemia Mother    • COPD Father    • Hypertension Father    • Hyperlipidemia Father        Medications Prior to Admission:    Prior to Admission medications    Medication Sig Start Date End Date Taking? Authorizing Provider   aspirin (aspirin) 81 MG EC tablet Take 1 tablet by mouth Daily.  Patient not taking: Reported on 4/9/2023 12/4/20 4/10/23  Wilfredo Herrera PA   Evolocumab with Infusor (REPATHA) solution cartridge Inject 3.5 mL under the skin into the appropriate area as directed Every 30 (Thirty) Days.  Patient not taking: Reported on 4/9/2023 12/4/20 4/10/23  Wilfredo Herrera PA   lisinopril (PRINIVIL,ZESTRIL) 5 MG tablet Take 5 mg by mouth Daily.  Patient not taking: Reported on 4/9/2023 11/9/20 4/10/23  Elías Montes MD   multivitamin with minerals tablet tablet Take 1 tablet by mouth Daily.  Patient not taking: Reported on 4/9/2023  4/10/23  Elías Montes MD       Allergies:  Patient has no known  allergies.      Review of system  Review of Systems   HENT: Negative for trouble swallowing.    Musculoskeletal: Negative for gait problem.   Neurological: Negative for speech difficulty, weakness, numbness and headaches.   All other systems reviewed and are negative.      Vitals:    04/10/23 0723   BP: 145/95   Pulse: 57   Resp: 18   Temp: 97.9 °F (36.6 °C)   SpO2: 97%       Physical exam  Physical Exam  Eyes:      Extraocular Movements: Extraocular movements intact.      Pupils: Pupils are equal, round, and reactive to light.   Cardiovascular:      Rate and Rhythm: Normal rate and regular rhythm.   Pulmonary:      Effort: Pulmonary effort is normal.   Neurological:      General: No focal deficit present.      Mental Status: He is oriented to person, place, and time.      Deep Tendon Reflexes: Babinski sign absent on the right side. Babinski sign absent on the left side.      Comments: Speech clear, VFF, no facial droop or limb drift.           Lab Results   Component Value Date    WBC 6.37 04/10/2023    HGB 14.7 04/10/2023    HCT 45.6 04/10/2023    MCV 90.7 04/10/2023     04/10/2023     Lab Results   Component Value Date    GLUCOSE 114 (H) 04/10/2023    BUN 16 04/10/2023    CREATININE 1.08 04/10/2023    BCR 14.8 04/10/2023    CO2 26.0 04/10/2023    CALCIUM 8.7 04/10/2023    ALBUMIN 4.1 04/10/2023    AST 24 04/10/2023    ALT 28 04/10/2023     Lipid Panel  Order: [id number]   Status: Final result      Visible to patient: No (scheduled for 4/10/2023  7:56 AM)      Next appt: None     Specimen Information: Blood    0 Result Notes     1  Topic      Component  Ref Range & Units 05:27   Total Cholesterol  0 - 200 mg/dL 192    Triglycerides  0 - 150 mg/dL 148    HDL Cholesterol  40 - 60 mg/dL 38 Low     LDL Cholesterol   0 - 100 mg/dL 127 High     VLDL Cholesterol  5 - 40 mg/dL 27    LDL/HDL Ratio 3.27         Hemoglobin A1C  4.80 - 5.60 % 6.40 High          Radiological Studies:  CT Angiogram Neck    Result Date:  4/9/2023  CT ANGIOGRAM HEAD W AI ANALYSIS OF LVO, CT ANGIOGRAM NECK Date of Exam: 4/9/2023 4:14 PM EDT Indication: Neuro deficit, acute stroke suspected Neuro deficit, acute stroke suspected. Comparison: None available. Technique: CTA of the head was performed after the uneventful intravenous administration of 125 mL Isovue-370. Reconstructed coronal and sagittal images were also obtained. In addition, a 3-D volume rendered image was created for interpretation. Automated exposure control and iterative reconstruction methods were used. Findings: Anterior circulation: Internal common carotid arteries are patent. Anterior cerebral arteries are patent. Anterior communicating artery seen. The middle cerebral arteries are patent including M2 division. No evidence of aneurysm. Posterior circulation: Vertebral arteries are patent. Posterior inferior, anterior inferior and superior cerebellar arteries are seen. The basilar artery is patent. Fetal origin of the left posterior cerebral artery. Posterior cerebral arteries appear patent. Left posterior communicating artery appears patent. No evidence of aneurysm. Venous structures: Dural venous sinuses and central cerebral veins appear grossly patent. Internal jugular veins are grossly patent. Bones: Mild degenerative changes of the cervical spine. No high-grade canal or foraminal stenosis. No evidence of acute fracture. Soft tissues: No abnormal brain parenchymal enhancement. Aerodigestive tract is grossly patent. Mildly enlarged thyroid without dominant nodule. No suspicious adenopathy. Visualized thoracic vessels are grossly patent. Lung apices appear clear.     Impression: No evidence of flow-limiting stenosis or occlusion of the major vessels of the head and neck. Electronically Signed: Dalton Figueroa  4/9/2023 4:51 PM EDT  Workstation ID: [id number]    MRI Brain Without Contrast    Result Date: 4/10/2023  MRI BRAIN WO CONTRAST Date of Exam: 4/10/2023 11:37 AM EDT  Indication: Stroke, follow up.  Comparison: April 9, 2023 CT head Technique:  Routine multiplanar/multisequence sequence images of the brain were obtained without contrast administration. Findings: There are a few scattered foci of increased T2 and FLAIR signal in the periventricular and subcortical white matter consistent with chronic microvascular ischemic change. There is no mass, mass effect or midline shift. There is no evidence of intracranial hemorrhage. There are no abnormal extra-axial fluid collections. Intracranial flow voids are preserved. The diffusion-weighted sequences are normal.     Impression: Mild chronic microvascular ischemic change. No acute intracranial process. No evidence of an acute or subacute infarct. Electronically Signed: Tk Chris  4/10/2023 12:42 PM EDT  Workstation ID: [id number]    XR Chest 1 View    Result Date: 4/9/2023  XR CHEST 1 VW Date of Exam: 4/9/2023 4:42 PM EDT Indication: Acute Stroke Protocol (onset < 12 hrs). Comparison: None available. Findings: Cardiomediastinal silhouette is within normal limits. Likely small left pleural effusion associated left basilar opacity. No pneumothorax. No evidence of acute osseous abnormalities. Visualized upper abdomen is unremarkable.     Impression: Likely small left pleural effusion with associated left basilar opacity which may reflect infection or atelectasis. Electronically Signed: Dalton Figueroa  4/9/2023 5:57 PM EDT  Workstation ID: [id number]    CT Head Without Contrast Stroke Protocol    Result Date: 4/9/2023  CT HEAD WO CONTRAST STROKE PROTOCOL Date of Exam: 4/9/2023 4:01 PM EDT Indication: Neuro deficit, acute, stroke suspected Neuro deficit, acute stroke suspected. Comparison: None available. Technique: Axial CT images were obtained of the head without contrast administration.  Reconstructed coronal and sagittal images were also obtained. Automated exposure control and iterative construction methods were used. Scan Time:   4:05 PM Results discussed with stroke team at 4:17 PM. Findings: Parenchyma:No acute intraparenchymal hemorrhage. No loss of gray-white differentiation to suggest large territory infarct. Normal parenchymal volume. No substantial white matter disease. No midline shift or herniation. Ventricles and extra axial spaces:Normal caliber of ventricles and sulci. No extra axial fluid collection seen. Other:Orbits are grossly intact. Scattered paranasal sinus mucosal thickening. Mastoid air cells are clear. Calvarium is intact. No substantial intracranial atherosclerotic calcification.     Impression: No evidence of acute intracranial hemorrhage or large territorial infarct. Electronically Signed: Dalton Figueroa  4/9/2023 4:23 PM EDT  Workstation ID: [id number]    CT Angiogram Head w AI Analysis of LVO    Result Date: 4/9/2023  CT ANGIOGRAM HEAD W AI ANALYSIS OF LVO, CT ANGIOGRAM NECK Date of Exam: 4/9/2023 4:14 PM EDT Indication: Neuro deficit, acute stroke suspected Neuro deficit, acute stroke suspected. Comparison: None available. Technique: CTA of the head was performed after the uneventful intravenous administration of 125 mL Isovue-370. Reconstructed coronal and sagittal images were also obtained. In addition, a 3-D volume rendered image was created for interpretation. Automated exposure control and iterative reconstruction methods were used. Findings: Anterior circulation: Internal common carotid arteries are patent. Anterior cerebral arteries are patent. Anterior communicating artery seen. The middle cerebral arteries are patent including M2 division. No evidence of aneurysm. Posterior circulation: Vertebral arteries are patent. Posterior inferior, anterior inferior and superior cerebellar arteries are seen. The basilar artery is patent. Fetal origin of the left posterior cerebral artery. Posterior cerebral arteries appear patent. Left posterior communicating artery appears patent. No evidence of aneurysm. Venous  structures: Dural venous sinuses and central cerebral veins appear grossly patent. Internal jugular veins are grossly patent. Bones: Mild degenerative changes of the cervical spine. No high-grade canal or foraminal stenosis. No evidence of acute fracture. Soft tissues: No abnormal brain parenchymal enhancement. Aerodigestive tract is grossly patent. Mildly enlarged thyroid without dominant nodule. No suspicious adenopathy. Visualized thoracic vessels are grossly patent. Lung apices appear clear.     Impression: No evidence of flow-limiting stenosis or occlusion of the major vessels of the head and neck. Electronically Signed: Dalton Figueroa  4/9/2023 4:51 PM EDT  Workstation ID: [id number]    CT CEREBRAL PERFUSION WITH & WITHOUT CONTRAST    Result Date: 4/9/2023  CT CEREBRAL PERFUSION W WO CONTRAST Date of Exam: 4/9/2023 4:14 PM EDT Indication: Neuro deficit, acute stroke suspected.  Comparison: None available. Technique: Axial CT images of the brain were obtained prior to and after the administration of 125 mL Isovue-370. Core blood volume, core blood flow, mean transit time, and Tmax images were obtained utilizing the Rapid software protocol. A limited CT angiogram of the head was also performed to measure the blood vessel density. The radiation dose reduction device was turned on for each scan per the ALARA (As Low as Reasonably Achievable) protocol. Findings: Symmetric and preserved CBV and CBF. No evidence of elevated T. Max. There is mildly elevated T-Max in the watershed regions consistent with mild borders on perfusion (volume 22 mL).     Impression: No CT perfusion evidence of infarct or ischemia. Electronically Signed: Dalton Figueroa  4/9/2023 4:40 PM EDT  Workstation ID: [id number]        During this visit the following were done:  Labs Reviewed [x]    Labs Ordered []    Radiology Reports Reviewed [x]    Radiology Ordered []    EKG, echo, and/or stress test reviewed [x]    EEG results reviewed  []    EEG  reviewed and interpreted per myself   []    Discussed case with neurointerventionalist or neuroradiologist [x]    Referring Provider Records Reviewed []    ER Records Reviewed []    Hospital Records Reviewed []    History Obtained From Family []    Radiological images view and Interpreted per myself [x]    Case Discussed with referring provider []     Decision to obtain and request outside records  []        Assessment and Plan     Acute stroke, MRI brain suggestive of small acute anterior right pontine stroke to my eye, chronic white matter changes, CTA normal. Favor small vessel thrombosis 22 vascular risks.   - Home on DAPT for 3 weeks, then aspirin 325 mg.   - High dose statin, LDL goal<70.   - BP<130/80.   - No lifting over 50 lbs for one month.   - Stroke clinic F/U in 2 months.              Electronically signed by Lauro Cunningham MD on 4/10/2023 at 14:42 EDT

## 2023-04-10 NOTE — CONSULTS
Diabetes Education  Assessment/Teaching    Patient Name:  Mal Magallanes  YOB: 1965  MRN: [id number]  Admit Date:  4/9/2023      Assessment Date:  4/10/2023    ]Order criteria not met for diabetes education consult. Current A1c is 6.4%, noted during chart review no history of DM and no home meds for DM.Patient does not qualify for the follow up stroke class based on the exclusion criteria listed. Thank you!     DM education will see patient for predm teaching.     Electronically signed by:  Becca Vazquez RN  04/10/23 10:11 EDT

## 2023-04-11 NOTE — OUTREACH NOTE
Prep Survey    Flowsheet Row Responses   [facility] facility patient discharged from? [facility]   Is LACE score < 7 ? Yes   Eligibility Readm Mgmt   Discharge diagnosis facial paresthesia, Left leg weakness Cerebrovascular accident (CVA), unspecified mechanism    Does the patient have one of the following disease processes/diagnoses(primary or secondary)? Stroke   Does the patient have Home health ordered? No   Is there a DME ordered? No   Prep survey completed? Yes          BARB ANDREW - Registered Nurse

## 2023-04-12 ENCOUNTER — READMISSION MANAGEMENT (OUTPATIENT)
Dept: CALL CENTER | Facility: HOSPITAL | Age: 58
End: 2023-04-12

## 2023-04-12 NOTE — OUTREACH NOTE
Stroke Week 1 Survey    Flowsheet Row Responses   [facility] patient discharged from? [facility]   Does the patient have one of the following disease processes/diagnoses(primary or secondary)? Stroke   Week 1 attempt successful? Yes   Call start time 0917   Call end time 0918   Discharge diagnosis facial paresthesia, Left leg weakness Cerebrovascular accident (CVA), unspecified mechanism    Meds reviewed with patient/caregiver? Yes   Is the patient having any side effects they believe may be caused by any medication additions or changes? No   Does the patient have all medications ordered at discharge? Yes   Is the patient taking all medications as directed (includes completed medication regime)? Yes   Does the patient have a primary care provider?  Yes   Does the patient have an appointment with their PCP within 7 days of discharge? Yes   Has the patient kept scheduled appointments due by today? N/A   Has home health visited the patient within 72 hours of discharge? N/A   Does the patient require any assistance with activities of daily living such as eating, bathing, dressing, walking, etc.? No   Does the patient have any residual symptoms from stroke/TIA? No   Did the patient receive a copy of their discharge instructions? Yes   Nursing interventions Reviewed instructions with patient   What is the patient's perception of their health status since discharge? Improving   Week 1 call completed? Yes   Revoked No further contact(revokes)-requires comment          Myrna SAVAGE - Registered Nurse

## 2023-09-18 ENCOUNTER — OFFICE VISIT (OUTPATIENT)
Dept: NEUROLOGY | Facility: CLINIC | Age: 58
End: 2023-09-18
Payer: COMMERCIAL

## 2023-09-18 VITALS
OXYGEN SATURATION: 98 % | BODY MASS INDEX: 32.6 KG/M2 | WEIGHT: 254 LBS | HEIGHT: 74 IN | SYSTOLIC BLOOD PRESSURE: 130 MMHG | TEMPERATURE: 98.4 F | DIASTOLIC BLOOD PRESSURE: 84 MMHG | HEART RATE: 72 BPM

## 2023-09-18 DIAGNOSIS — I10 HYPERTENSION, UNSPECIFIED TYPE: ICD-10-CM

## 2023-09-18 DIAGNOSIS — R29.818 SUSPECTED SLEEP APNEA: ICD-10-CM

## 2023-09-18 DIAGNOSIS — Z86.73 HISTORY OF STROKE: Primary | ICD-10-CM

## 2023-09-18 DIAGNOSIS — E78.5 HYPERLIPIDEMIA LDL GOAL <70: ICD-10-CM

## 2023-09-18 DIAGNOSIS — R00.2 PALPITATIONS: ICD-10-CM

## 2023-09-18 PROCEDURE — 99212 OFFICE O/P EST SF 10 MIN: CPT | Performed by: NURSE PRACTITIONER

## 2023-09-18 RX ORDER — UBIDECARENONE 100 MG
100 CAPSULE ORAL DAILY
COMMUNITY

## 2023-09-18 RX ORDER — IBUPROFEN 800 MG/1
800 TABLET ORAL AS NEEDED
COMMUNITY
Start: 2023-07-05

## 2023-09-18 NOTE — PROGRESS NOTES
New Patient Office Visit      Encounter Date: 2023   Patient Name: Mal Magallanes  : 1965   MRN: [id number]   PCP: Sherry Herrera PA    Referring Provider: MURTAZA Roberts     Chief Complaint:    Chief Complaint   Patient presents with    Follow-up       History of Present Illness: Mal Magallanes is a 58 y.o. male with known medical diagnoses of HTN and HLDHTN who presented to the [facility] ED on 2023 with complaints of left leg weakness, left facial paresthesias, difficulty ambulating.  NIH was 1.  Initial CT head negative for any acute changes.  CTP with normal brain perfusion.  CTA H/N with no flow-limiting stenosis, occlusion, or aneurysm noted.  MRI brain revealed a small acute anterior right pontine stroke.  Etiology was thought to be small vessel disease secondary to multiple vascular risk factors.  It was recommended that he do DAPT for 21 days followed by aspirin 325 mg daily.  High-dose statin recommended as well.  He was noted to have an increased left atrium on TTE.  Negative for PFO.  Patient does report intermittent episodes of heart palpitations.  His wife states that she is checked his pulse when these episodes occur and that it is so high that she cannot count it.  She also reports that they are irregular.  Discussed with the patient and his wife that this is concerning for arrhythmia.  We will place an order to the heart and valve clinic for cardiac monitoring.  Discussed with the patient and his wife that should symptoms recur that he should come to the ED for evaluation.  He denies any additional stroke symptoms.  He reports compliance with his aspirin and atorvastatin.    Stroke Risk Factors: hyperlipidemia and hypertension      Subjective      Review of Systems:   Review of Systems   Constitutional:  Negative for chills and fever.   HENT:  Negative for congestion and trouble swallowing.    Eyes:  Negative for photophobia and visual disturbance.   Respiratory:  Negative  "for cough and shortness of breath.    Cardiovascular:  Positive for palpitations. Negative for chest pain.   Musculoskeletal:  Negative for gait problem.   Neurological: Negative.      Past Medical History:   Past Medical History:   Diagnosis Date    Hyperlipidemia     Hypertension     Stroke        Past Surgical History:   Past Surgical History:   Procedure Laterality Date    HAND SURGERY      R hand       Family History:   Family History   Problem Relation Age of Onset    Heart disease Mother     Hypertension Mother     Hyperlipidemia Mother     COPD Father     Hypertension Father     Hyperlipidemia Father        Social History:   Social History     Socioeconomic History    Marital status:    Tobacco Use    Smoking status: Never     Passive exposure: Never    Smokeless tobacco: Never   Vaping Use    Vaping Use: Never used   Substance and Sexual Activity    Alcohol use: Not Currently    Drug use: Never    Sexual activity: Defer       Medications:     Current Outpatient Medications:     aspirin  MG tablet, Take 1 tablet by mouth Daily., Disp: 100 tablet, Rfl: 11    atorvastatin (Lipitor) 40 MG tablet, Take 1 tablet by mouth Every Night., Disp: 30 tablet, Rfl: 11    coenzyme Q10 100 MG capsule, Take 1 capsule by mouth Daily., Disp: , Rfl:     ibuprofen (ADVIL,MOTRIN) 800 MG tablet, Take 1 tablet by mouth As Needed., Disp: , Rfl:     losartan (Cozaar) 25 MG tablet, Take 1 tablet by mouth Daily., Disp: 30 tablet, Rfl: 11    Allergies:   No Known Allergies    Objective     Physical Exam:  Vital Signs:   Vitals:    09/18/23 1327   BP: 130/84   Pulse: 72   Temp: 98.4 °F (36.9 °C)   SpO2: 98%   Weight: 115 kg (254 lb)   Height: 188 cm (74.02\")     Body mass index is 32.6 kg/m².     Physical Exam  Constitutional:       General: He is not in acute distress.  HENT:      Head: Normocephalic and atraumatic.   Eyes:      Extraocular Movements: Extraocular movements intact.      Pupils: Pupils are equal, round, and " reactive to light.   Cardiovascular:      Rate and Rhythm: Normal rate and regular rhythm.   Pulmonary:      Effort: Pulmonary effort is normal. No respiratory distress.   Abdominal:      General: There is no distension.      Palpations: Abdomen is soft.   Musculoskeletal:         General: Normal range of motion.      Cervical back: Normal range of motion and neck supple.   Skin:     General: Skin is warm and dry.      Capillary Refill: Capillary refill takes less than 2 seconds.   Neurological:      General: No focal deficit present.      Mental Status: He is alert and oriented to person, place, and time. Mental status is at baseline.      Motor: Motor strength is normal.      Deep Tendon Reflexes:      Reflex Scores:       Patellar reflexes are 2+ on the right side and 2+ on the left side.  Psychiatric:         Mood and Affect: Mood normal.         Speech: Speech normal.         Behavior: Behavior normal.     Neurological Exam  Mental Status  Alert. Oriented to person, place, time and situation. Oriented to person, place, and time. Speech is normal. Language is fluent with no aphasia.    Cranial Nerves  CN II: Visual acuity is normal. Visual fields full to confrontation.  CN III, IV, VI: Extraocular movements intact bilaterally. Pupils equal round and reactive to light bilaterally.  CN V: Facial sensation is normal.  CN VII: Full and symmetric facial movement.  CN IX, X: Palate elevates symmetrically  CN XI: Shoulder shrug strength is normal.  CN XII: Tongue midline without atrophy or fasciculations.    Motor   Strength is 5/5 throughout all four extremities.    Sensory  Light touch is normal in upper and lower extremities.     Reflexes                                            Right                      Left  Patellar                                2+                         2+    Coordination  Right: Finger-to-nose normal.Left: Finger-to-nose normal.    Gait  Casual gait is normal including stance, stride, and  arm swing.     NIH Stroke Scale    1a  Level of consciousness: 0=alert; keenly responsive   1b. LOC questions:  0=Answers both questions correctly    1c. LOC commands: 0=Performs both tasks correctly   2.  Best Gaze: 0=normal   3. Visual: 0=No visual loss   4. Facial Palsy: 0=Normal symmetric movement   5a. Motor left arm: 0=No drift, limb holds 90 (or 45) degrees for full 10 seconds   5b.  Motor right arm: 0=No drift, limb holds 90 (or 45) degrees for full 10 seconds   6a. Motor left le=No drift, limb holds 90 (or 45) degrees for full 10 seconds   6b  Motor right le=No drift, limb holds 90 (or 45) degrees for full 10 seconds   7. Limb Ataxia: 0=Absent   8.  Sensory: 0=Normal; no sensory loss   9. Best Language:  0=No aphasia, normal   10. Dysarthria: 0=Normal   11. Extinction and Inattention: 0=No abnormality    Total:   0         Modified Ingrid Score: 0        0  No Symptoms    1 No significant disability. Able to carry out all usual activities, despite some symptoms.    2 Slight disability. Able to look after own affairs without assistance, but unable to carry out all previous activities.    3 Moderate disability. Requires some help, but able to walk unassisted.    4 Moderately severe disability. Unable to attend to own bodily needs without assistance, and unable to walk unassisted.    5 Severe disability. Requires constant nursing care and attention, bedridden, incontinent.    6 Dead        PHQ-9 Depression Screening  Little interest or pleasure in doing things? 0-->not at all   Feeling down, depressed, or hopeless? 0-->not at all   Trouble falling or staying asleep, or sleeping too much?     Feeling tired or having little energy?     Poor appetite or overeating?     Feeling bad about yourself - or that you are a failure or have let yourself or your family down?     Trouble concentrating on things, such as reading the newspaper or watching television?     Moving or speaking so slowly that other people  "could have noticed? Or the opposite - being so fidgety or restless that you have been moving around a lot more than usual?     Thoughts that you would be better off dead, or of hurting yourself in some way?     PHQ-9 Total Score 0   If you checked off any problems, how difficult have these problems made it for you to do your work, take care of things at home, or get along with other people?       STOP-Bang Score  Have you been diagnosed with Sleep Apnea?: no  Snoring?: yes  Tired?: no  Observed?: no  Pressure?: yes  Stop Score: 2  Body Mass Index more than 35 kg/m2?: no  Age older than 50 year old?: yes  Neck large? \">17\"/43cm-M, >16\"/41cm-F: yes  Gender=Male?: yes  Total Stop-Bang Score: 5    Imaging Reviewed:   CT Angiogram Neck     Result Date: 4/9/2023  Impression: No evidence of flow-limiting stenosis or occlusion of the major vessels of the head and neck. Electronically Signed: Dalton Figueroa  4/9/2023 4:51 PM EDT  Workstation ID: [id number]     CT Head Without Contrast Stroke Protocol     Result Date: 4/9/2023  Impression: No evidence of acute intracranial hemorrhage or large territorial infarct. Electronically Signed: Dalton Figueroa  4/9/2023 4:23 PM EDT  Workstation ID: [id number]     CT Angiogram Head w AI Analysis of LVO     Result Date: 4/9/2023  Impression: No evidence of flow-limiting stenosis or occlusion of the major vessels of the head and neck. Electronically Signed: Dalton Figueroa  4/9/2023 4:51 PM EDT  Workstation ID: [id number]     CT CEREBRAL PERFUSION WITH & WITHOUT CONTRAST     Result Date: 4/9/2023  Impression: No CT perfusion evidence of infarct or ischemia. Electronically Signed: Dalton Figueroa  4/9/2023 4:40 PM EDT  Workstation ID: [id number]    MRI brain with anterior pontine infarct  Results for orders placed during the hospital encounter of 04/09/23    Adult Transthoracic Echo Complete W/ Cont if Necessary Per Protocol (With Agitated Saline)    Interpretation Summary    Left " ventricular systolic function is normal. Calculated left ventricular EF = 57.1% Normal left ventricular cavity size noted. Left ventricular wall thickness is consistent with mild concentric hypertrophy. All left ventricular wall segments contract normally. Left ventricular diastolic function was normal.    The left atrial cavity is dilated. Left atrial volume is moderately increased. Saline test results are negative.    Normal right atrial cavity size noted. The inferior vena cava is normally sized. Normal IVC inspiratory collapse of greater than 50% noted.    The aortic valve is grossly normal in structure. The aortic valve appears trileaflet. No significant aortic valve regurgitation is present. No hemodynamically significant aortic valve stenosis is present.    Hypermobile echodensity in the subvalvular apparatus consistent with redundant chords without prolapse or significant regurgitation seen Trace mitral valve regurgitation is present. No significant mitral valve stenosis is present.    No dilation of the aortic root is present. Aortic root = 3.5 cm No dilation of the sinuses of Valsalva is present. Mild dilation of the ascending aorta is present. Ascending aorta = 3.7 cm      Laboratory Results:  Lipid Panel          4/10/2023    05:27   Lipid Panel   Total Cholesterol 192    Triglycerides 148    HDL Cholesterol 38    VLDL Cholesterol 27    LDL Cholesterol  127    LDL/HDL Ratio 3.27      Hemoglobin A1c: 6.4      Assessment / Plan      Assessment/Plan:   Diagnoses and all orders for this visit:    1. History of stroke (Primary)  -Continue aspirin 325 mg daily  -Continue atorvastatin 40 mg daily  -BP goals less than 130/80  -Heart healthy diet  -Increase activity as tolerated  -Stop bang score 5, Ambulatory referral to sleep medicine  -Hemoglobin A1c 6.4 in the prediabetic range, recheck with PCP  -Return to the ED with any additional strokelike symptoms    2. Hyperlipidemia LDL goal <70  - in  April.  -Goal less than 70 for secondary stroke prevention  -Recheck lipid panel  -Continue atorvastatin 40 mg daily    3. Hypertension, unspecified type  -BP goals less than 130/80     4.  Palpitations  -Ambulatory referral to heart valve clinic for cardiac event monitor  -Discussed with the patient and his wife that should he come to the ED for further evaluation if symptoms recur    Discussed the importance of medication compliance Aspirin 325mg daily and Atorvastatin 40mg nightly and lifestyle modifications adequate control of blood pressure, adequate control of cholesterol (goal LDL <70), adequate control of glucose (<140, A1c goal <7), increased physical activity, and implementation of healthy diet to help reduce the risk of future cerebrovascular events.  Also discussed the signs symptoms that would warrant the patient return back to the emergency department including unilateral weakness, unilateral numbness, visual disturbances, loss of balance, speech difficulties, and/or a sudden severe headache.  Patient verbalized understanding.        Follow Up:   Return in about 3 months (around 12/18/2023).    ISRA Mao, AGACNP-BC  [facility]

## 2023-09-18 NOTE — PATIENT INSTRUCTIONS
Continue aspirin 325 mg daily  Continue atorvastatin 40 mg daily  BP goals less than 130/80  Heart healthy diet  Increase activity as tolerated  Referral to Heart and Valve clinic for cardiac monitor  Return to the ED with any additional strokelike symptoms  Recheck Lipid panel, Hemoglobin A1c

## 2023-09-27 ENCOUNTER — HOSPITAL ENCOUNTER (OUTPATIENT)
Dept: CARDIOLOGY | Facility: HOSPITAL | Age: 58
Discharge: HOME | End: 2023-09-27
Payer: COMMERCIAL

## 2023-09-27 ENCOUNTER — OFFICE VISIT (OUTPATIENT)
Dept: CARDIOLOGY | Facility: HOSPITAL | Age: 58
End: 2023-09-27
Payer: COMMERCIAL

## 2023-09-27 VITALS
WEIGHT: 263.9 LBS | TEMPERATURE: 98.2 F | BODY MASS INDEX: 33.87 KG/M2 | SYSTOLIC BLOOD PRESSURE: 130 MMHG | RESPIRATION RATE: 18 BRPM | DIASTOLIC BLOOD PRESSURE: 82 MMHG | HEIGHT: 74 IN | OXYGEN SATURATION: 97 % | HEART RATE: 63 BPM

## 2023-09-27 DIAGNOSIS — Z86.73 HISTORY OF CVA (CEREBROVASCULAR ACCIDENT): ICD-10-CM

## 2023-09-27 DIAGNOSIS — I10 PRIMARY HYPERTENSION: ICD-10-CM

## 2023-09-27 DIAGNOSIS — R00.2 PALPITATIONS: Primary | ICD-10-CM

## 2023-09-27 DIAGNOSIS — E78.5 HYPERLIPIDEMIA, UNSPECIFIED HYPERLIPIDEMIA TYPE: ICD-10-CM

## 2023-09-27 DIAGNOSIS — R00.2 PALPITATIONS: ICD-10-CM

## 2023-09-27 PROCEDURE — 93270 REMOTE 30 DAY ECG REV/REPORT: CPT

## 2023-09-27 NOTE — PROGRESS NOTES
"[facility] Heart and Vascular    Chief Complaint  Palpitations (Tx of TIA, ? cardioembolic)    Subjective    History of Present Illness {  Problem List  Visit  Diagnosis   Encounters  Notes  Medications  Labs  Result Review Imaging  Media :23}     Mal Magallanes presents to [facility] CARD[facility]OLOGY for   History of Present Illness     58-year-old male with hypertension, hyperlipidemia, obesity, TIA/CVA    Patient presented to [facility] on 4/9/2023 with TIA. CT head and CTA head and neck were unremarkable. CT perfusion scan with no evidence of infarct or reversible ischemia. Symptoms improved within 24 hours and MRI brain was negative which is consistent with TIA.  Discharged on dual antiplatelet therapy for 21 days been full aspirin.  Discharged on losartan and statin with co-Q10 (previously developed muscle cramps with statins).    Patient with palpitations (jittery feeling).    No CP, pressure, dyspnea, dizziness, near syncope, syncope    Frequent wakening, snoring.  Refused sleep study.     Objective     Vital Signs:   Vitals:    09/27/23 1505 09/27/23 1507 09/27/23 1508   BP: 137/79 142/94 130/82   BP Location: Right arm Left arm Left arm   Patient Position: Sitting Standing Sitting   Cuff Size: Large Adult Adult Large Adult   Pulse: 64 72 63   Resp:   18   Temp: 98.2 °F (36.8 °C) 98.2 °F (36.8 °C) 98.2 °F (36.8 °C)   TempSrc: Temporal Temporal Temporal   SpO2: 96% 98% 97%   Weight:   120 kg (263 lb 14.4 oz)   Height:   188 cm (74\")     Body mass index is 33.88 kg/m².  Physical Exam  Vitals reviewed.   Constitutional:       General: He is not in acute distress.  Neck:      Vascular: No carotid bruit.   Cardiovascular:      Rate and Rhythm: Normal rate and regular rhythm.      Heart sounds: No murmur heard.  Pulmonary:      Effort: Pulmonary effort is normal.      Breath sounds: Normal breath sounds.   Musculoskeletal:      Right lower leg: No " edema.      Left lower leg: No edema.   Skin:     Coloration: Skin is not pale.   Neurological:      Mental Status: He is alert.   Psychiatric:         Mood and Affect: Mood normal.         Behavior: Behavior normal. Behavior is cooperative.            Result Review  Data Reviewed:{ Labs  Result Review  Imaging  Med Tab  Media :23}   CT Angiogram Neck     Result Date: 4/9/2023  Impression: No evidence of flow-limiting stenosis or occlusion of the major vessels of the head and neck. Electronically Signed: Dalton Figueroa  4/9/2023 4:51 PM EDT  Workstation ID: [id number]     CT Head Without Contrast Stroke Protocol     Result Date: 4/9/2023  Impression: No evidence of acute intracranial hemorrhage or large territorial infarct. Electronically Signed: Dalton Figueroa  4/9/2023 4:23 PM EDT  Workstation ID: [id number]     CT Angiogram Head w AI Analysis of LVO     Result Date: 4/9/2023  Impression: No evidence of flow-limiting stenosis or occlusion of the major vessels of the head and neck. Electronically Signed: Dalton Figueroa  4/9/2023 4:51 PM EDT  Workstation ID: [id number]     CT CEREBRAL PERFUSION WITH & WITHOUT CONTRAST     Result Date: 4/9/2023  Impression: No CT perfusion evidence of infarct or ischemia. Electronically Signed: Dalton Figueroa  4/9/2023 4:40 PM EDT  Workstation ID: [id number]     MRI brain with anterior pontine infarct  Results for orders placed during the hospital encounter of 04/09/23     Adult Transthoracic Echo Complete W/ Cont if Necessary Per Protocol (With Agitated Saline)     Interpretation Summary    Left ventricular systolic function is normal. Calculated left ventricular EF = 57.1% Normal left ventricular cavity size noted. Left ventricular wall thickness is consistent with mild concentric hypertrophy. All left ventricular wall segments contract normally. Left ventricular diastolic function was normal.    The left atrial cavity is dilated. Left atrial volume is moderately increased.  Saline test results are negative.    Normal right atrial cavity size noted. The inferior vena cava is normally sized. Normal IVC inspiratory collapse of greater than 50% noted.    The aortic valve is grossly normal in structure. The aortic valve appears trileaflet. No significant aortic valve regurgitation is present. No hemodynamically significant aortic valve stenosis is present.    Hypermobile echodensity in the subvalvular apparatus consistent with redundant chords without prolapse or significant regurgitation seen Trace mitral valve regurgitation is present. No significant mitral valve stenosis is present.    No dilation of the aortic root is present. Aortic root = 3.5 cm No dilation of the sinuses of Valsalva is present. Mild dilation of the ascending aorta is present. Ascending aorta = 3.7 cm                 Assessment and Plan {CC Problem List  Visit Diagnosis  ROS  Review (Popup)  Health Maintenance  Quality  BestPractice  Medications  SmartSets  SnapShot Encounters  Media :23}   1. Palpitations    - Mobile Cardiac Outpatient Telemetry  - Mobile Cardiac Outpatient Telemetry; Future  - Ambulatory Referral to Cardiology    2. Primary hypertension  losartan    3. Hyperlipidemia, unspecified hyperlipidemia type  statin    4. History of CVA  Asa, statin    - Mobile Cardiac Outpatient Telemetry; Future  - Ambulatory Referral to Cardiology          Follow Up {Instructions Charge Capture  Follow-up Communications :23}   Return if symptoms worsen or fail to improve.    Patient was given instructions and counseling regarding his condition or for health maintenance advice. Please see specific information pulled into the AVS if appropriate.  Patient was instructed to call the Heart and Valve Center with any questions, concerns, or worsening symptoms.

## 2023-09-27 NOTE — PROGRESS NOTES
[facility] Heart Monitor Documentation    Mal Magallanes  1965  [id number]  09/27/23      [] ZIO XT Patch  Model [id number] Prescribed for N/A Days    Serial Number: (N + 9 Digits) N   Apply-By Date on Box:   USPS Tracking Number:   USPS Tracking        [] [employer] MINI PLUS Mobile Cardiac Telemetry  Model BGMINIPLUS Prescribed for 30 Days    Serial Number: (BGM + 7 Digits) [id number]  Shipped-By Date on Box: 09/19/23  UPS Tracking Number: [id number]  UPS Tracking      [] [employer] MINI Holter Monitor  Model BGMINIEL Prescribed for N/A Days    Serial Number: (7 Digits)   Shipped-By Date on Box:   UPS Tracking Number: [id number]  UPS Tracking        This monitor was applied to the patient's chest and checked for proper functioning.  Mr. Mal Magallanes was instructed in the proper use of this monitor.  He was given the opportunity to ask questions and left the office with the device 's instruction manual.    Marita Chavez MA, 16:43 EDT, 09/27/23                  [facility] 8[facility]

## 2023-11-22 NOTE — PROGRESS NOTES
" [facility]  [address], [address]  [place], [place], [id number] [phone]  WWW.[email or web address]           OUTPATIENT CLINIC CONSULTATION NOTE    Patient care team:  Patient Care Team:  Sherry Herrera PA as PCP - General (Internal Medicine)    Requesting Provider and Reason for consultation: The patient is being seen today at the request of ISRA Parkinson for palpitations, TIA.     Subjective:   Chief complaint:   Chief Complaint   Patient presents with    Palpitations         Mal Magallanes is a 58 y.o. male.  Cardiac focused problem list:  Possible nocturnal palpitations  Echocardiogram 4/10/2023:  LVEF 57.1%. Mild LVH. Dilated LV cavity. LA volume moderately increased. Saline test negative. Trace mitral stenosis. Mild dilation of the ascending aorta, 3.7 cm.   30 day MCOT, 10/26/2023:  No significant arrhythmias noted.   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 Cunningham  Mildly dilated LV, dilated LA  History of [facility]  Performed due to some abnormal screening test that led to a stress test that was abnormal.  [facility] 5/2021, Dr. Bishop, normal coronaries  Hypertension   Myalgias with statins, controlled with co-Q10  Zetia added 11/2023  Hyperlipidemia     HPI:    Patient presents today in consultation for palpitations and TIA.     Patient presented to [facility] on 4/9/2023 with neurologic symptoms. CT head and CTA head and neck were unremarkable. CT perfusion scan with no evidence of infarct or reversible ischemia. Symptoms improved within 24 hours.  MRI suggestive of a small anterior right pontine stroke, deemed to be due to small vessel disease per neurology.    Subsequently in September, the patient described a \"jittery\" feeling at night once every few months that awoke him from sleep.  Recommended sleep eval.  Patient deferred.  Has not had any recently.  Did not have any episodes while wearing a heart " monitor for 30 days.  Denies daytime somnolence, morning headaches.  Blood pressure controlled in the 130s over 70s at home.    Active, works as a contractor in construction; denies exertional chest pain/dyspnea, palpitations    Non-smoker    Review of Systems:  As noted above in the HPI    PFSH:  Patient Active Problem List   Diagnosis    Cerebrovascular accident (CVA), unspecified mechanism    Elevated glucose    HTN (hypertension)    HLD (hyperlipidemia)         Current Outpatient Medications:     aspirin  MG tablet, Take 1 tablet by mouth Daily., Disp: 100 tablet, Rfl: 11    atorvastatin (Lipitor) 40 MG tablet, Take 1 tablet by mouth Every Night., Disp: 30 tablet, Rfl: 11    coenzyme Q10 100 MG capsule, Take 1 capsule by mouth Daily., Disp: , Rfl:     ezetimibe (ZETIA) 10 MG tablet, Take 1 tablet by mouth Daily., Disp: , Rfl:     ibuprofen (ADVIL,MOTRIN) 800 MG tablet, Take 1 tablet by mouth As Needed., Disp: , Rfl:     losartan (Cozaar) 25 MG tablet, Take 1 tablet by mouth Daily., Disp: 30 tablet, Rfl: 11    No Known Allergies    Social History     Socioeconomic History    Marital status:    Tobacco Use    Smoking status: Never     Passive exposure: Never    Smokeless tobacco: Never   Vaping Use    Vaping Use: Never used   Substance and Sexual Activity    Alcohol use: Never    Drug use: Never    Sexual activity: Defer     Family History   Problem Relation Age of Onset    Heart disease Mother     Hypertension Mother     Hyperlipidemia Mother     COPD Father     Hypertension Father     Hyperlipidemia Father     Heart failure Sister     Hypertension Sister     Kidney disease Sister     Mitral valve prolapse Sister     Heart disease Brother     Heart attack Brother     Stroke Brother     Diabetes Brother     Coronary artery disease Brother     No Known Problems Maternal Grandmother     No Known Problems Maternal Grandfather     No Known Problems Paternal Grandmother     No Known Problems Paternal  "Grandfather          Objective:   Physical Exam:  /90 (BP Location: Right arm, Patient Position: Sitting, Cuff Size: Adult)   Pulse 92   Ht 188 cm (74\")   Wt 120 kg (265 lb)   SpO2 100%   BMI 34.02 kg/m²   CONSTITUTIONAL: No acute distress  RESPIRATORY: Normal effort. Clear to auscultation bilaterally without wheezing or rales  CARDIOVASCULAR: Regular rate and rhythm with normal S1 and S2. Without murmur.  PERIPHERAL VASCULAR: No carotid bruit bilaterally.  Normal radial pulse. There is no lower extremity edema bilaterally.    Labs:  Labs reviewed by myself    Lab Results   Component Value Date    CHOL 192 04/10/2023     Lab Results   Component Value Date    TRIG 148 04/10/2023     Lab Results   Component Value Date    HDL 38 (L) 04/10/2023     Lab Results   Component Value Date     (H) 04/10/2023     No components found for: \"LDLDIRECTC\"    Diagnostic Data:    Procedures    Results for orders placed during the hospital encounter of 04/09/23    Adult Transthoracic Echo Complete W/ Cont if Necessary Per Protocol (With Agitated Saline)    Interpretation Summary    Left ventricular systolic function is normal. Calculated left ventricular EF = 57.1% Normal left ventricular cavity size noted. Left ventricular wall thickness is consistent with mild concentric hypertrophy. All left ventricular wall segments contract normally. Left ventricular diastolic function was normal.    The left atrial cavity is dilated. Left atrial volume is moderately increased. Saline test results are negative.    Normal right atrial cavity size noted. The inferior vena cava is normally sized. Normal IVC inspiratory collapse of greater than 50% noted.    The aortic valve is grossly normal in structure. The aortic valve appears trileaflet. No significant aortic valve regurgitation is present. No hemodynamically significant aortic valve stenosis is present.    Hypermobile echodensity in the subvalvular apparatus consistent with " redundant chords without prolapse or significant regurgitation seen Trace mitral valve regurgitation is present. No significant mitral valve stenosis is present.    No dilation of the aortic root is present. Aortic root = 3.5 cm No dilation of the sinuses of Valsalva is present. Mild dilation of the ascending aorta is present. Ascending aorta = 3.7 cm      Assessment and Plan:     Palpitations  Essential hypertension  LVH  Dilated LA  Sleep disturbances  -Without daytime palpitations.  Rare nighttime symptoms.  Discussed dilated left atrium with patient today.  -Encouraged a low-salt diet, cardiac exercise/fitness  -Continue current antihypertensive medication  -If averaging above 130/90 mmHg, uptitrate losartan  -If with worsening sleep/recurrent disturbances recommend sleep medicine evaluation    Hyperlipidemia  -Zetia recently added.  If with persistently elevated LDL (without a 50% reduction in LDL with oral medications or persistently elevated estimated ASCVD or LDL persistently above 100 despite medical therapy), consider adding/switching therapy to Repatha or Leqvio    History of CVA   -Follow-up with neurology  -Continue aspirin and cholesterol reduction medication    - Return in about 1 year (around 11/27/2024) for Next scheduled follow-up with an ECG.

## 2023-11-27 ENCOUNTER — OFFICE VISIT (OUTPATIENT)
Dept: CARDIOLOGY | Facility: CLINIC | Age: 58
End: 2023-11-27
Payer: COMMERCIAL

## 2023-11-27 VITALS
DIASTOLIC BLOOD PRESSURE: 90 MMHG | HEART RATE: 92 BPM | SYSTOLIC BLOOD PRESSURE: 130 MMHG | OXYGEN SATURATION: 100 % | WEIGHT: 265 LBS | BODY MASS INDEX: 34.01 KG/M2 | HEIGHT: 74 IN

## 2023-11-27 DIAGNOSIS — E78.5 HYPERLIPIDEMIA, UNSPECIFIED HYPERLIPIDEMIA TYPE: ICD-10-CM

## 2023-11-27 DIAGNOSIS — R00.2 PALPITATIONS: Primary | ICD-10-CM

## 2023-11-27 DIAGNOSIS — Z86.73 HISTORY OF CVA (CEREBROVASCULAR ACCIDENT): ICD-10-CM

## 2023-11-27 PROCEDURE — 99244 OFF/OP CNSLTJ NEW/EST MOD 40: CPT | Performed by: INTERNAL MEDICINE

## 2023-11-27 RX ORDER — EZETIMIBE 10 MG/1
10 TABLET ORAL DAILY
COMMUNITY

## 2023-12-06 ENCOUNTER — TELEPHONE (OUTPATIENT)
Dept: NEUROLOGY | Facility: CLINIC | Age: 58
End: 2023-12-06
Payer: COMMERCIAL

## 2023-12-08 NOTE — TELEPHONE ENCOUNTER
LVM WIFE (PRIMARY) TO R/S, PROVIDER OUT OF OFFICE. PT'S PHONE VM FULL. SCHEDULE NEXT AVAILABLE FOR SUGEY LINN.

## 2024-03-12 ENCOUNTER — OFFICE VISIT (OUTPATIENT)
Dept: INTERNAL MEDICINE | Facility: CLINIC | Age: 59
End: 2024-03-12
Payer: COMMERCIAL

## 2024-03-12 VITALS
DIASTOLIC BLOOD PRESSURE: 80 MMHG | HEIGHT: 76 IN | WEIGHT: 269 LBS | OXYGEN SATURATION: 98 % | RESPIRATION RATE: 20 BRPM | HEART RATE: 61 BPM | BODY MASS INDEX: 32.76 KG/M2 | SYSTOLIC BLOOD PRESSURE: 138 MMHG | TEMPERATURE: 98 F

## 2024-03-12 DIAGNOSIS — Z13.5 DIABETIC RETINOPATHY SCREENING: ICD-10-CM

## 2024-03-12 DIAGNOSIS — Z12.5 SCREENING PSA (PROSTATE SPECIFIC ANTIGEN): ICD-10-CM

## 2024-03-12 DIAGNOSIS — Z00.00 ROUTINE HEALTH MAINTENANCE: Primary | ICD-10-CM

## 2024-03-12 DIAGNOSIS — E78.5 HYPERLIPIDEMIA, UNSPECIFIED HYPERLIPIDEMIA TYPE: ICD-10-CM

## 2024-03-12 DIAGNOSIS — Z23 ENCOUNTER FOR VACCINATION: ICD-10-CM

## 2024-03-12 DIAGNOSIS — N28.1 RENAL CYST: ICD-10-CM

## 2024-03-12 DIAGNOSIS — I10 HYPERTENSION, UNSPECIFIED TYPE: ICD-10-CM

## 2024-03-12 DIAGNOSIS — R13.10 DYSPHAGIA, UNSPECIFIED TYPE: ICD-10-CM

## 2024-03-12 DIAGNOSIS — M79.10 MUSCLE PAIN: ICD-10-CM

## 2024-03-12 DIAGNOSIS — E11.65 TYPE 2 DIABETES MELLITUS WITH HYPERGLYCEMIA, WITHOUT LONG-TERM CURRENT USE OF INSULIN: ICD-10-CM

## 2024-03-12 DIAGNOSIS — Z12.11 COLON CANCER SCREENING: ICD-10-CM

## 2024-03-12 DIAGNOSIS — I63.9 CEREBROVASCULAR ACCIDENT (CVA), UNSPECIFIED MECHANISM: ICD-10-CM

## 2024-03-12 DIAGNOSIS — R73.09 ELEVATED GLUCOSE: ICD-10-CM

## 2024-03-12 DIAGNOSIS — E66.09 CLASS 1 OBESITY DUE TO EXCESS CALORIES WITH SERIOUS COMORBIDITY AND BODY MASS INDEX (BMI) OF 33.0 TO 33.9 IN ADULT: ICD-10-CM

## 2024-03-12 PROBLEM — E66.811 CLASS 1 OBESITY DUE TO EXCESS CALORIES WITH SERIOUS COMORBIDITY AND BODY MASS INDEX (BMI) OF 33.0 TO 33.9 IN ADULT: Status: ACTIVE | Noted: 2024-03-12

## 2024-03-12 PROBLEM — R80.9 MICROALBUMINURIA: Status: ACTIVE | Noted: 2024-03-12

## 2024-03-12 PROBLEM — R73.03 PREDIABETES: Status: ACTIVE | Noted: 2024-03-12

## 2024-03-12 RX ORDER — IBUPROFEN 800 MG/1
800 TABLET ORAL EVERY 8 HOURS PRN
Qty: 90 TABLET | Refills: 1 | Status: SHIPPED | OUTPATIENT
Start: 2024-03-12

## 2024-03-12 RX ORDER — FLUOROURACIL 50 MG/G
CREAM TOPICAL
COMMUNITY
Start: 2024-02-20

## 2024-03-12 RX ORDER — ZOSTER VACCINE RECOMBINANT, ADJUVANTED 50 MCG/0.5
0.5 KIT INTRAMUSCULAR ONCE
Qty: 1 EACH | Refills: 1 | Status: CANCELLED | OUTPATIENT
Start: 2024-03-12 | End: 2024-03-12

## 2024-03-12 RX ORDER — CALCIPOTRIENE 50 UG/G
CREAM TOPICAL
COMMUNITY
Start: 2024-02-24
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
## 2024-03-13 NOTE — ASSESSMENT & PLAN NOTE
The patient is no longer following with neurology.   He occasionally has residual symptoms of disequilibrium, but this is improving.   I recommend continuing good blood pressure control with losartan 25 mg daily.   Continue aspirin 325 mg daily as recommended by neurology.   He will continue atorvastatin 40 mg daily.   His last lipid panel was not with LDL goal less than 70.   We will repeat fasting lipid panel with future labs.   If this is still not improved, we will consider switching to Repatha.

## 2024-03-13 NOTE — ASSESSMENT & PLAN NOTE
This is chronic, this is improved.   The patient reports having multiple EGDs with dilation in the past.   Last in 2020 with [facility].   We will obtain report and monitor for need for repeat testing.

## 2024-03-13 NOTE — ASSESSMENT & PLAN NOTE
This is chronic and unchanged.   The patient has failed Crestor in the past and also failed Zetia recently.   He will continue Lipitor 40 mg daily.

## 2024-03-13 NOTE — ASSESSMENT & PLAN NOTE
This is chronic, and unchanged.   I will check hemoglobin A1c.   He was counseled on carb-consistent diet and exercise.

## 2024-03-13 NOTE — ASSESSMENT & PLAN NOTE
This is chronic, this is improving with treatment.   He will continue losartan 25 mg once daily.

## 2024-03-13 NOTE — ASSESSMENT & PLAN NOTE
This was incidentally noted on CT scan dated 03/09/2026.   At that time, he was noted to have a 3 cm low-attenuation right renal mass, which was not a simple cyst.   There was concern for renal malignancy.   His wife reports that they previously saw Dr. Montiel of urology who recommended monitoring.   I will obtain renal ultrasound and urinalysis with microscope.   We will determine need for urology evaluation pending results.

## 2024-04-11 ENCOUNTER — LAB (OUTPATIENT)
Dept: LAB | Facility: HOSPITAL | Age: 59
End: 2024-04-11
Payer: COMMERCIAL

## 2024-04-11 DIAGNOSIS — N28.1 RENAL CYST: ICD-10-CM

## 2024-04-11 DIAGNOSIS — Z12.5 SCREENING PSA (PROSTATE SPECIFIC ANTIGEN): ICD-10-CM

## 2024-04-11 DIAGNOSIS — E78.5 HYPERLIPIDEMIA, UNSPECIFIED HYPERLIPIDEMIA TYPE: ICD-10-CM

## 2024-04-11 DIAGNOSIS — Z00.00 ROUTINE HEALTH MAINTENANCE: ICD-10-CM

## 2024-04-11 DIAGNOSIS — E11.65 TYPE 2 DIABETES MELLITUS WITH HYPERGLYCEMIA, WITHOUT LONG-TERM CURRENT USE OF INSULIN: ICD-10-CM

## 2024-04-11 LAB
ALBUMIN SERPL-MCNC: 4.6 G/DL (ref 3.5–5.2)
ALBUMIN/GLOB SERPL: 1.7 G/DL
ALP SERPL-CCNC: 96 U/L (ref 39–117)
ALT SERPL W P-5'-P-CCNC: 23 U/L (ref 1–41)
ANION GAP SERPL CALCULATED.3IONS-SCNC: 12.1 MMOL/L (ref 5–15)
AST SERPL-CCNC: 22 U/L (ref 1–40)
BACTERIA UR QL AUTO: NORMAL /HPF
BASOPHILS # BLD AUTO: 0.09 10*3/MM3 (ref 0–0.2)
BASOPHILS NFR BLD AUTO: 1.5 % (ref 0–1.5)
BILIRUB SERPL-MCNC: 0.8 MG/DL (ref 0–1.2)
BILIRUB UR QL STRIP: NEGATIVE
BUN SERPL-MCNC: 18 MG/DL (ref 6–20)
BUN/CREAT SERPL: 17 (ref 7–25)
CALCIUM SPEC-SCNC: 9.3 MG/DL (ref 8.6–10.5)
CHLORIDE SERPL-SCNC: 105 MMOL/L (ref 98–107)
CHOLEST SERPL-MCNC: 192 MG/DL (ref 0–200)
CLARITY UR: CLEAR
CO2 SERPL-SCNC: 24.9 MMOL/L (ref 22–29)
COLOR UR: YELLOW
CREAT SERPL-MCNC: 1.06 MG/DL (ref 0.76–1.27)
DEPRECATED RDW RBC AUTO: 42.3 FL (ref 37–54)
EGFRCR SERPLBLD CKD-EPI 2021: 81.3 ML/MIN/1.73
EOSINOPHIL # BLD AUTO: 0.26 10*3/MM3 (ref 0–0.4)
EOSINOPHIL NFR BLD AUTO: 4.4 % (ref 0.3–6.2)
ERYTHROCYTE [DISTWIDTH] IN BLOOD BY AUTOMATED COUNT: 13 % (ref 12.3–15.4)
GLOBULIN UR ELPH-MCNC: 2.7 GM/DL
GLUCOSE SERPL-MCNC: 104 MG/DL (ref 65–99)
GLUCOSE UR STRIP-MCNC: NEGATIVE MG/DL
HBA1C MFR BLD: 7 % (ref 4.8–5.6)
HCT VFR BLD AUTO: 47.3 % (ref 37.5–51)
HCV AB SER DONR QL: NORMAL
HDLC SERPL-MCNC: 39 MG/DL (ref 40–60)
HGB BLD-MCNC: 15.3 G/DL (ref 13–17.7)
HGB UR QL STRIP.AUTO: NEGATIVE
HIV 1+2 AB+HIV1 P24 AG SERPL QL IA: NORMAL
HYALINE CASTS UR QL AUTO: NORMAL /LPF
IMM GRANULOCYTES # BLD AUTO: 0.01 10*3/MM3 (ref 0–0.05)
IMM GRANULOCYTES NFR BLD AUTO: 0.2 % (ref 0–0.5)
KETONES UR QL STRIP: NEGATIVE
LDLC SERPL CALC-MCNC: 131 MG/DL (ref 0–100)
LDLC/HDLC SERPL: 3.31 {RATIO}
LEUKOCYTE ESTERASE UR QL STRIP.AUTO: NEGATIVE
LYMPHOCYTES # BLD AUTO: 2.31 10*3/MM3 (ref 0.7–3.1)
LYMPHOCYTES NFR BLD AUTO: 39.4 % (ref 19.6–45.3)
MCH RBC QN AUTO: 29 PG (ref 26.6–33)
MCHC RBC AUTO-ENTMCNC: 32.3 G/DL (ref 31.5–35.7)
MCV RBC AUTO: 89.6 FL (ref 79–97)
MONOCYTES # BLD AUTO: 0.73 10*3/MM3 (ref 0.1–0.9)
MONOCYTES NFR BLD AUTO: 12.4 % (ref 5–12)
NEUTROPHILS NFR BLD AUTO: 2.47 10*3/MM3 (ref 1.7–7)
NEUTROPHILS NFR BLD AUTO: 42.1 % (ref 42.7–76)
NITRITE UR QL STRIP: NEGATIVE
NRBC BLD AUTO-RTO: 0 /100 WBC (ref 0–0.2)
PH UR STRIP.AUTO: 5.5 [PH] (ref 5–8)
PLATELET # BLD AUTO: 289 10*3/MM3 (ref 140–450)
PMV BLD AUTO: 10.7 FL (ref 6–12)
POTASSIUM SERPL-SCNC: 4.3 MMOL/L (ref 3.5–5.2)
PROT SERPL-MCNC: 7.3 G/DL (ref 6–8.5)
PROT UR QL STRIP: ABNORMAL
PSA SERPL-MCNC: 0.66 NG/ML (ref 0–4)
RBC # BLD AUTO: 5.28 10*6/MM3 (ref 4.14–5.8)
RBC # UR STRIP: NORMAL /HPF
REF LAB TEST METHOD: NORMAL
SODIUM SERPL-SCNC: 142 MMOL/L (ref 136–145)
SP GR UR STRIP: 1.02 (ref 1–1.03)
SQUAMOUS #/AREA URNS HPF: NORMAL /HPF
T4 FREE SERPL-MCNC: 0.93 NG/DL (ref 0.93–1.7)
TRIGL SERPL-MCNC: 119 MG/DL (ref 0–150)
TSH SERPL DL<=0.05 MIU/L-ACNC: 0.69 UIU/ML (ref 0.27–4.2)
UROBILINOGEN UR QL STRIP: ABNORMAL
VLDLC SERPL-MCNC: 22 MG/DL (ref 5–40)
WBC # UR STRIP: NORMAL /HPF
WBC NRBC COR # BLD AUTO: 5.87 10*3/MM3 (ref 3.4–10.8)

## 2024-04-11 PROCEDURE — 82043 UR ALBUMIN QUANTITATIVE: CPT

## 2024-04-11 PROCEDURE — 80053 COMPREHEN METABOLIC PANEL: CPT

## 2024-04-11 PROCEDURE — 84443 ASSAY THYROID STIM HORMONE: CPT

## 2024-04-11 PROCEDURE — 85025 COMPLETE CBC W/AUTO DIFF WBC: CPT

## 2024-04-11 PROCEDURE — 83036 HEMOGLOBIN GLYCOSYLATED A1C: CPT

## 2024-04-11 PROCEDURE — 82570 ASSAY OF URINE CREATININE: CPT

## 2024-04-11 PROCEDURE — 80061 LIPID PANEL: CPT

## 2024-04-11 PROCEDURE — 86803 HEPATITIS C AB TEST: CPT

## 2024-04-11 PROCEDURE — 84439 ASSAY OF FREE THYROXINE: CPT

## 2024-04-11 PROCEDURE — G0103 PSA SCREENING: HCPCS

## 2024-04-11 PROCEDURE — 81001 URINALYSIS AUTO W/SCOPE: CPT

## 2024-04-11 PROCEDURE — G0432 EIA HIV-1/HIV-2 SCREEN: HCPCS

## 2024-04-12 DIAGNOSIS — I10 HYPERTENSION, UNSPECIFIED TYPE: Primary | ICD-10-CM

## 2024-04-12 DIAGNOSIS — E11.65 TYPE 2 DIABETES MELLITUS WITH HYPERGLYCEMIA, WITHOUT LONG-TERM CURRENT USE OF INSULIN: ICD-10-CM

## 2024-04-12 DIAGNOSIS — E78.5 HYPERLIPIDEMIA, UNSPECIFIED HYPERLIPIDEMIA TYPE: ICD-10-CM

## 2024-04-12 LAB
ALBUMIN UR-MCNC: 11.4 MG/DL
CREAT UR-MCNC: 218.8 MG/DL
MICROALBUMIN/CREAT UR: 52.1 MG/G (ref 0–29)

## 2024-04-12 RX ORDER — METFORMIN HYDROCHLORIDE 500 MG/1
1000 TABLET, EXTENDED RELEASE ORAL
Qty: 180 TABLET | Refills: 3 | Status: SHIPPED | OUTPATIENT
Start: 2024-04-12

## 2024-04-12 RX ORDER — ATORVASTATIN CALCIUM 80 MG/1
80 TABLET, FILM COATED ORAL DAILY
Qty: 90 TABLET | Refills: 3 | Status: SHIPPED | OUTPATIENT
Start: 2024-04-12

## 2024-04-12 RX ORDER — LOSARTAN POTASSIUM 25 MG/1
25 TABLET ORAL DAILY
Qty: 90 TABLET | Refills: 3 | Status: SHIPPED | OUTPATIENT
Start: 2024-04-12 | End: 2025-04-12

## 2024-04-12 NOTE — TELEPHONE ENCOUNTER
Patients wife notified and verbalized understanding.   Patient needs refills and agrees to increasing statin and starting metformin

## 2024-04-12 NOTE — TELEPHONE ENCOUNTER
Caller: YRIS SAGE    Relationship: Emergency Contact    Best call back number: [phone]     Requested Prescriptions:   Requested Prescriptions     Pending Prescriptions Disp Refills    losartan (Cozaar) 25 MG tablet 30 tablet 11     Sig: Take 1 tablet by mouth Daily.    LIPITOR 80 MG    Pharmacy where request should be sent: [facility] [address] [place] [address] [phone] [place] [phone]      Last office visit with prescribing clinician: 3/12/2024   Last telemedicine visit with prescribing clinician: Visit date not found   Next office visit with prescribing clinician: 9/19/2024     Additional details provided by patient: PATIENT IS OUT    Does the patient have less than a 3 day supply:  [x] Yes  [] No    Would you like a call back once the refill request has been completed: [] Yes [x] No    If the office needs to give you a call back, can they leave a voicemail: [] Yes [x] No    Yomaira Pinto Rep   04/12/24 10:38 EDT

## 2024-04-12 NOTE — TELEPHONE ENCOUNTER
----- Message from Eddie Ashley MD sent at 4/12/2024 10:14 AM EDT -----  Please call the patient regarding his abnormal result.    Overall labs within acceptable ranges, normal kidney function, liver enzymes and electrolytes.  Normal blood cell counts.  Urine testing with mild protein, but no blood or other concerning findings.  Negative HIV and hep C screening.  Normal thyroid testing.  Normal PSA testing.  Blood sugar was elevated and A1c is increased to 7% consistent with type 2 diabetes.  I would recommend starting a medication such as metformin.  If you would like me to call this in please let me know.  If you like to have an appointment to discuss this further please schedule.  Cholesterol persistently elevated.  Would recommend increasing atorvastatin to 80 mg daily.  Please let me know if you agree.    Dr. Ashley

## 2024-05-07 ENCOUNTER — HOSPITAL ENCOUNTER (OUTPATIENT)
Facility: HOSPITAL | Age: 59
Discharge: HOME | End: 2024-05-07
Admitting: STUDENT IN AN ORGANIZED HEALTH CARE EDUCATION/TRAINING PROGRAM
Payer: COMMERCIAL

## 2024-05-07 DIAGNOSIS — N28.1 RENAL CYST: ICD-10-CM

## 2024-05-07 PROCEDURE — 76775 US EXAM ABDO BACK WALL LIM: CPT

## 2024-09-24 ENCOUNTER — OFFICE VISIT (OUTPATIENT)
Dept: INTERNAL MEDICINE | Facility: CLINIC | Age: 59
End: 2024-09-24
Payer: COMMERCIAL

## 2024-09-24 VITALS
DIASTOLIC BLOOD PRESSURE: 80 MMHG | HEART RATE: 60 BPM | BODY MASS INDEX: 31.31 KG/M2 | TEMPERATURE: 98.2 F | RESPIRATION RATE: 20 BRPM | OXYGEN SATURATION: 98 % | WEIGHT: 254.5 LBS | SYSTOLIC BLOOD PRESSURE: 128 MMHG

## 2024-09-24 DIAGNOSIS — L57.0 ACTINIC KERATOSES: ICD-10-CM

## 2024-09-24 DIAGNOSIS — M79.10 MUSCLE PAIN: ICD-10-CM

## 2024-09-24 DIAGNOSIS — E11.65 TYPE 2 DIABETES MELLITUS WITH HYPERGLYCEMIA, WITHOUT LONG-TERM CURRENT USE OF INSULIN: ICD-10-CM

## 2024-09-24 DIAGNOSIS — R80.9 MICROALBUMINURIA: Primary | ICD-10-CM

## 2024-09-24 DIAGNOSIS — E78.5 HYPERLIPIDEMIA, UNSPECIFIED HYPERLIPIDEMIA TYPE: ICD-10-CM

## 2024-09-24 DIAGNOSIS — I10 HYPERTENSION, UNSPECIFIED TYPE: ICD-10-CM

## 2024-09-24 LAB
ALBUMIN SERPL-MCNC: 4.6 G/DL (ref 3.5–5.2)
ALBUMIN/GLOB SERPL: 1.8 G/DL
ALP SERPL-CCNC: 81 U/L (ref 39–117)
ALT SERPL W P-5'-P-CCNC: 31 U/L (ref 1–41)
ANION GAP SERPL CALCULATED.3IONS-SCNC: 9.9 MMOL/L (ref 5–15)
AST SERPL-CCNC: 28 U/L (ref 1–40)
BILIRUB SERPL-MCNC: 0.5 MG/DL (ref 0–1.2)
BUN SERPL-MCNC: 20 MG/DL (ref 6–20)
BUN/CREAT SERPL: 19.2 (ref 7–25)
CALCIUM SPEC-SCNC: 9.3 MG/DL (ref 8.6–10.5)
CHLORIDE SERPL-SCNC: 105 MMOL/L (ref 98–107)
CHOLEST SERPL-MCNC: 180 MG/DL (ref 0–200)
CO2 SERPL-SCNC: 24.1 MMOL/L (ref 22–29)
CREAT SERPL-MCNC: 1.04 MG/DL (ref 0.76–1.27)
EGFRCR SERPLBLD CKD-EPI 2021: 82.7 ML/MIN/1.73
EXPIRATION DATE: ABNORMAL
GLOBULIN UR ELPH-MCNC: 2.5 GM/DL
GLUCOSE SERPL-MCNC: 95 MG/DL (ref 65–99)
HBA1C MFR BLD: 6.3 % (ref 4.5–5.7)
HDLC SERPL-MCNC: 40 MG/DL (ref 40–60)
LDLC SERPL CALC-MCNC: 109 MG/DL (ref 0–100)
LDLC/HDLC SERPL: 2.63 {RATIO}
Lab: ABNORMAL
POTASSIUM SERPL-SCNC: 3.9 MMOL/L (ref 3.5–5.2)
PROT SERPL-MCNC: 7.1 G/DL (ref 6–8.5)
SODIUM SERPL-SCNC: 139 MMOL/L (ref 136–145)
TRIGL SERPL-MCNC: 175 MG/DL (ref 0–150)
VLDLC SERPL-MCNC: 31 MG/DL (ref 5–40)

## 2024-09-24 PROCEDURE — 80053 COMPREHEN METABOLIC PANEL: CPT | Performed by: STUDENT IN AN ORGANIZED HEALTH CARE EDUCATION/TRAINING PROGRAM

## 2024-09-24 PROCEDURE — 80061 LIPID PANEL: CPT | Performed by: STUDENT IN AN ORGANIZED HEALTH CARE EDUCATION/TRAINING PROGRAM

## 2024-09-24 PROCEDURE — 99214 OFFICE O/P EST MOD 30 MIN: CPT | Performed by: STUDENT IN AN ORGANIZED HEALTH CARE EDUCATION/TRAINING PROGRAM

## 2024-09-24 PROCEDURE — 36415 COLL VENOUS BLD VENIPUNCTURE: CPT | Performed by: STUDENT IN AN ORGANIZED HEALTH CARE EDUCATION/TRAINING PROGRAM

## 2024-09-24 PROCEDURE — 83036 HEMOGLOBIN GLYCOSYLATED A1C: CPT | Performed by: STUDENT IN AN ORGANIZED HEALTH CARE EDUCATION/TRAINING PROGRAM

## 2024-09-24 RX ORDER — ASPIRIN 81 MG/1
81 TABLET ORAL DAILY
COMMUNITY

## 2024-09-24 RX ORDER — IBUPROFEN 800 MG/1
800 TABLET, FILM COATED ORAL EVERY 8 HOURS PRN
Qty: 90 TABLET | Refills: 1 | Status: SHIPPED | OUTPATIENT
Start: 2024-09-24

## 2024-09-24 RX ORDER — FLUOROURACIL 50 MG/G
CREAM TOPICAL 2 TIMES DAILY
Qty: 40 G | Refills: 0 | Status: SHIPPED | OUTPATIENT
Start: 2024-09-24 | End: 2024-10-08

## 2024-09-24 RX ORDER — EZETIMIBE 10 MG/1
10 TABLET ORAL DAILY
COMMUNITY
Start: 2023-10-13 | End: 2024-09-24

## 2024-10-15 ENCOUNTER — HOSPITAL ENCOUNTER (OUTPATIENT)
Dept: GENERAL RADIOLOGY | Facility: HOSPITAL | Age: 59
Discharge: HOME | End: 2024-10-15
Admitting: NURSE PRACTITIONER
Payer: COMMERCIAL

## 2024-10-15 ENCOUNTER — OFFICE VISIT (OUTPATIENT)
Dept: INTERNAL MEDICINE | Facility: CLINIC | Age: 59
End: 2024-10-15
Payer: COMMERCIAL

## 2024-10-15 VITALS
SYSTOLIC BLOOD PRESSURE: 140 MMHG | DIASTOLIC BLOOD PRESSURE: 86 MMHG | TEMPERATURE: 98 F | RESPIRATION RATE: 14 BRPM | HEIGHT: 76 IN | HEART RATE: 92 BPM | BODY MASS INDEX: 31.34 KG/M2 | WEIGHT: 257.4 LBS

## 2024-10-15 DIAGNOSIS — T14.8XXA PUNCTURE WOUND: ICD-10-CM

## 2024-10-15 DIAGNOSIS — T14.8XXA PUNCTURE WOUND: Primary | ICD-10-CM

## 2024-10-15 DIAGNOSIS — Z23 ENCOUNTER FOR IMMUNIZATION: ICD-10-CM

## 2024-10-15 PROCEDURE — 99213 OFFICE O/P EST LOW 20 MIN: CPT | Performed by: NURSE PRACTITIONER

## 2024-10-15 PROCEDURE — 90471 IMMUNIZATION ADMIN: CPT | Performed by: NURSE PRACTITIONER

## 2024-10-15 PROCEDURE — 73630 X-RAY EXAM OF FOOT: CPT

## 2024-10-15 PROCEDURE — 90715 TDAP VACCINE 7 YRS/> IM: CPT | Performed by: NURSE PRACTITIONER

## 2024-10-15 RX ORDER — CEPHALEXIN 500 MG/1
500 CAPSULE ORAL 4 TIMES DAILY
Qty: 28 CAPSULE | Refills: 0 | Status: SHIPPED | OUTPATIENT
Start: 2024-10-15

## 2024-10-15 RX ORDER — SULFAMETHOXAZOLE/TRIMETHOPRIM 800-160 MG
1 TABLET ORAL 2 TIMES DAILY
Qty: 20 TABLET | Refills: 0 | Status: SHIPPED | OUTPATIENT
Start: 2024-10-15

## 2024-10-15 NOTE — PROGRESS NOTES
Patient Name: Mal Magallanes  : 1965   MRN: [id number]     Chief Complaint:    Chief Complaint   Patient presents with    Puncture Wound     Using a Nail gun he was shot into right foot with a nail        History of Present Illness: Mal Magallanes is a 59 y.o. male.  History of Present Illness  The patient is a 59-year-old male who presents for evaluation of a nail gun injury to his right lateral ankle.    He sustained the injury yesterday morning, with the nail penetrating deeply into the subcutaneous tissue of lateral right ankle. He removed the nail himself and continued his work. Since then, the ankle has swelled slightly. He has been managing the pain with ibuprofen 800 mg and reports being able to walk on it without significant discomfort. He is currently taking cephalexin 500 mg twice daily.  He rates his pain 2 out of 10.    He reports no history of MRSA or recent fevers or drainage.   Approximately 8 or 9 years ago, he had a similar incident where another nail penetrated his bone.         Subjective     Review of System: Review of Systems     Medications:     Current Outpatient Medications:     aspirin 81 MG EC tablet, Take 1 tablet by mouth Daily., Disp: , Rfl:     atorvastatin (Lipitor) 80 MG tablet, Take 1 tablet by mouth Daily., Disp: 90 tablet, Rfl: 3    calcipotriene (DOVONEX) 0.005 % cream, APPLY A THIN LAYER TO THE AFFECTED AREAS ON FACE TWICE A DAY FOR 2 WEEKS, TAKE 1 TWO WEEK BREAK. REPEAT CYCLE. MIX WITH EFUDEX., Disp: , Rfl:     coenzyme Q10 100 MG capsule, Take 1 capsule by mouth Daily., Disp: , Rfl:     ibuprofen (ADVIL,MOTRIN) 800 MG tablet, Take 1 tablet by mouth Every 8 (Eight) Hours As Needed for Moderate Pain., Disp: 90 tablet, Rfl: 1    losartan (Cozaar) 25 MG tablet, Take 1 tablet by mouth Daily., Disp: 90 tablet, Rfl: 3    cephalexin (Keflex) 500 MG capsule, Take 1 capsule by mouth 4 (Four) Times a Day., Disp: 28 capsule, Rfl: 0    sulfamethoxazole-trimethoprim (BACTRIM  "DS,SEPTRA DS) 800-160 MG per tablet, Take 1 tablet by mouth 2 (Two) Times a Day., Disp: 20 tablet, Rfl: 0    Allergies:   Allergies   Allergen Reactions    Metformin GI Intolerance       Objective     Physical Exam:   Vital Signs:   Vitals:    10/15/24 1605   BP: 140/86   BP Location: Right arm   Patient Position: Sitting   Cuff Size: Adult   Pulse: 92   Resp: 14   Temp: 98 °F (36.7 °C)   TempSrc: Infrared   Weight: 117 kg (257 lb 6.4 oz)   Height: 191.8 cm (75.5\")   PainSc:   2     Body mass index is 31.75 kg/m².        Physical Exam  Constitutional:       General: He is not in acute distress.     Appearance: He is not ill-appearing.   Cardiovascular:      Rate and Rhythm: Regular rhythm.      Pulses: Normal pulses.      Heart sounds: Normal heart sounds.   Pulmonary:      Breath sounds: Normal breath sounds.   Musculoskeletal:      Right lower leg: Edema present.   Skin:     Capillary Refill: Capillary refill takes 2 to 3 seconds.       Physical Exam         Results  Imaging  X-ray of the right lateral ankle showed no foreign body and no fractures.     Assessment / Plan      Assessment/Plan:   Diagnoses and all orders for this visit:    1. Puncture wound (Primary)  -     XR Foot 3+ View Right; Future  -     sulfamethoxazole-trimethoprim (BACTRIM DS,SEPTRA DS) 800-160 MG per tablet; Take 1 tablet by mouth 2 (Two) Times a Day.  Dispense: 20 tablet; Refill: 0  -     cephalexin (Keflex) 500 MG capsule; Take 1 capsule by mouth 4 (Four) Times a Day.  Dispense: 28 capsule; Refill: 0    2. Encounter for immunization  -     Tdap Vaccine => 8yo IM (BOOSTRIX/ADACEL)       Assessment & Plan  1. Right lateral ankle injury.  A 3-inch nail penetrated the right lateral ankle 36 hours ago. Imaging today showed no foreign body or fractures. He is currently on Cephalexin 500 mg twice a day (prescription given by a family member).  This will be increased to 4 times daily. Bactrim will be added to his antibiotic regimen to cover " potential MRSA staph infection. He reports no fever but has experienced some swelling, which he manages with ibuprofen 800 mg. The wound's cleanliness is a concern due to the nail passing through his shoe.  He will take an oral probiotic or drink a half a cup of Kefir twice daily while taking antibiotic.  Patient will return sooner or go to the ER if any worsening signs of infection including fever, chills, redness, swelling or drainage.           Follow Up:   Return in about 3 days (around 10/18/2024) for Recheck.  Patient or patient representative verbalized consent for the use of Ambient Listening during the visit with  ISRA Dorsey for chart documentation. 10/15/2024  17:03 EDT    ISRA Dorsey  [facility]

## 2024-10-15 NOTE — LETTER
[facility]  Vaccine Consent Form    Patient Name:  Mal Magallanes  Patient :  1965     Vaccine(s) Ordered    Tdap Vaccine => 8yo IM (BOOSTRIX/ADACEL)        Screening Checklist  The following questions should be completed prior to vaccination. If you answer “yes” to any question, it does not necessarily mean you should not be vaccinated. It just means we may need to clarify or ask more questions. If a question is unclear, please ask your healthcare provider to explain it.    Yes No   Any fever or moderate to severe illness today (mild illness and/or antibiotic treatment are not contraindications)?     Do you have a history of a serious reaction to any previous vaccinations, such as anaphylaxis, encephalopathy within 7 days, Guillain-Joppa syndrome within 6 weeks, seizure?     Have you received any live vaccine(s) (e.g MMR, MIGUELITO) or any other vaccines in the last month (to ensure duplicate doses aren't given)?     Do you have an anaphylactic allergy to latex (DTaP, DTaP-IPV, Hep A, Hep B, MenB, RV, Td, Tdap), baker’s yeast (Hep B, HPV), polysorbates (RSV, nirsevimab, PCV 20, Rotavirrus, Tdap, Shingrix), or gelatin (MIGUELITO, MMR)?     Do you have an anaphylactic allergy to neomycin (Rabies, MIGUELITO, MMR, IPV, Hep A), polymyxin B (IPV), or streptomycin (IPV)?      Any cancer, leukemia, AIDS, or other immune system disorder? (MIGUELITO, MMR, RV)     Do you have a parent, brother, or sister with an immune system problem (if immune competence of vaccine recipient clinically verified, can proceed)? (MMR, MIGUELITO)     Any recent steroid treatments for >2 weeks, chemotherapy, or radiation treatment? (MIGUELITO, MMR)     Have you received antibody-containing blood transfusions or IVIG in the past 11 months (recommended interval is dependent on product)? (MMR, MIGUELITO)     Have you taken antiviral drugs (acyclovir, famciclovir, valacyclovir for MIGUELITO) in the last 24 or 48 hours, respectively?      Are you pregnant or planning to become pregnant  "within 1 month? (MIGUELITO, MMR, HPV, IPV, MenB, Abrexvy; For Hep B- refer to Engerix-B; For RSV - Abrysvo is indicated for 32-36 weeks of pregnancy from September to January)     For infants, have you ever been told your child has had intussusception or a medical emergency involving obstruction of the intestine (Rotavirus)? If not for an infant, can skip this question.         *Ordering Physicians/APC should be consulted if \"yes\" is checked by the patient or guardian above.  I have received, read, and understand the Vaccine Information Statement (VIS) for each vaccine ordered.  I have considered my or my child's health status as well as the health status of my close contacts.  I have taken the opportunity to discuss my vaccine questions with my or my child's health care provider.   I have requested that the ordered vaccine(s) be given to me or my child.  I understand the benefits and risks of the vaccines.  I understand that I should remain in the clinic for 15 minutes after receiving the vaccine(s).  _________________________________________________________  Signature of Patient or Parent/Legal Guardian ____________________  Date   "

## 2025-03-27 ENCOUNTER — OFFICE VISIT (OUTPATIENT)
Dept: INTERNAL MEDICINE | Facility: CLINIC | Age: 60
End: 2025-03-27
Payer: COMMERCIAL

## 2025-03-27 VITALS
TEMPERATURE: 97 F | SYSTOLIC BLOOD PRESSURE: 130 MMHG | OXYGEN SATURATION: 97 % | WEIGHT: 251 LBS | BODY MASS INDEX: 32.21 KG/M2 | RESPIRATION RATE: 20 BRPM | HEART RATE: 69 BPM | DIASTOLIC BLOOD PRESSURE: 80 MMHG | HEIGHT: 74 IN

## 2025-03-27 DIAGNOSIS — Z00.00 ROUTINE HEALTH MAINTENANCE: Primary | ICD-10-CM

## 2025-03-27 DIAGNOSIS — Z12.5 SCREENING PSA (PROSTATE SPECIFIC ANTIGEN): ICD-10-CM

## 2025-03-27 DIAGNOSIS — Z12.11 COLON CANCER SCREENING: ICD-10-CM

## 2025-03-27 DIAGNOSIS — E11.65 TYPE 2 DIABETES MELLITUS WITH HYPERGLYCEMIA, WITHOUT LONG-TERM CURRENT USE OF INSULIN: ICD-10-CM

## 2025-03-27 DIAGNOSIS — R80.9 MICROALBUMINURIA: ICD-10-CM

## 2025-03-27 DIAGNOSIS — E78.5 HYPERLIPIDEMIA, UNSPECIFIED HYPERLIPIDEMIA TYPE: ICD-10-CM

## 2025-03-27 DIAGNOSIS — E66.811 CLASS 1 OBESITY DUE TO EXCESS CALORIES WITH SERIOUS COMORBIDITY AND BODY MASS INDEX (BMI) OF 32.0 TO 32.9 IN ADULT: ICD-10-CM

## 2025-03-27 DIAGNOSIS — E66.09 CLASS 1 OBESITY DUE TO EXCESS CALORIES WITH SERIOUS COMORBIDITY AND BODY MASS INDEX (BMI) OF 32.0 TO 32.9 IN ADULT: ICD-10-CM

## 2025-03-27 DIAGNOSIS — I10 HYPERTENSION, UNSPECIFIED TYPE: ICD-10-CM

## 2025-03-27 PROBLEM — R73.03 PREDIABETES: Status: RESOLVED | Noted: 2024-03-12 | Resolved: 2025-03-27

## 2025-03-27 LAB
ALBUMIN SERPL-MCNC: 4.5 G/DL (ref 3.5–5.2)
ALBUMIN/GLOB SERPL: 1.6 G/DL
ALP SERPL-CCNC: 94 U/L (ref 39–117)
ALT SERPL W P-5'-P-CCNC: 36 U/L (ref 1–41)
ANION GAP SERPL CALCULATED.3IONS-SCNC: 13.9 MMOL/L (ref 5–15)
AST SERPL-CCNC: 33 U/L (ref 1–40)
BACTERIA UR QL AUTO: NORMAL /HPF
BASOPHILS # BLD AUTO: 0.05 10*3/MM3 (ref 0–0.2)
BASOPHILS NFR BLD AUTO: 1.1 % (ref 0–1.5)
BILIRUB SERPL-MCNC: 0.9 MG/DL (ref 0–1.2)
BILIRUB UR QL STRIP: NEGATIVE
BUN SERPL-MCNC: 22 MG/DL (ref 6–20)
BUN/CREAT SERPL: 20.4 (ref 7–25)
CALCIUM SPEC-SCNC: 9.6 MG/DL (ref 8.6–10.5)
CHLORIDE SERPL-SCNC: 104 MMOL/L (ref 98–107)
CHOLEST SERPL-MCNC: 175 MG/DL (ref 0–200)
CLARITY UR: ABNORMAL
CO2 SERPL-SCNC: 26.1 MMOL/L (ref 22–29)
COLOR UR: ABNORMAL
CREAT SERPL-MCNC: 1.08 MG/DL (ref 0.76–1.27)
DEPRECATED RDW RBC AUTO: 40.5 FL (ref 37–54)
EGFRCR SERPLBLD CKD-EPI 2021: 79.1 ML/MIN/1.73
EOSINOPHIL # BLD AUTO: 0.26 10*3/MM3 (ref 0–0.4)
EOSINOPHIL NFR BLD AUTO: 5.5 % (ref 0.3–6.2)
ERYTHROCYTE [DISTWIDTH] IN BLOOD BY AUTOMATED COUNT: 12.8 % (ref 12.3–15.4)
GLOBULIN UR ELPH-MCNC: 2.9 GM/DL
GLUCOSE SERPL-MCNC: 105 MG/DL (ref 65–99)
GLUCOSE UR STRIP-MCNC: NEGATIVE MG/DL
HBA1C MFR BLD: 6.5 % (ref 4.8–5.6)
HCT VFR BLD AUTO: 45.9 % (ref 37.5–51)
HDLC SERPL-MCNC: 43 MG/DL (ref 40–60)
HGB BLD-MCNC: 15.6 G/DL (ref 13–17.7)
HGB UR QL STRIP.AUTO: NEGATIVE
HYALINE CASTS UR QL AUTO: NORMAL /LPF
IMM GRANULOCYTES # BLD AUTO: 0.02 10*3/MM3 (ref 0–0.05)
IMM GRANULOCYTES NFR BLD AUTO: 0.4 % (ref 0–0.5)
KETONES UR QL STRIP: ABNORMAL
LDLC SERPL CALC-MCNC: 112 MG/DL (ref 0–100)
LDLC/HDLC SERPL: 2.55 {RATIO}
LEUKOCYTE ESTERASE UR QL STRIP.AUTO: NEGATIVE
LYMPHOCYTES # BLD AUTO: 1.64 10*3/MM3 (ref 0.7–3.1)
LYMPHOCYTES NFR BLD AUTO: 34.7 % (ref 19.6–45.3)
MCH RBC QN AUTO: 29.8 PG (ref 26.6–33)
MCHC RBC AUTO-ENTMCNC: 34 G/DL (ref 31.5–35.7)
MCV RBC AUTO: 87.8 FL (ref 79–97)
MONOCYTES # BLD AUTO: 0.56 10*3/MM3 (ref 0.1–0.9)
MONOCYTES NFR BLD AUTO: 11.9 % (ref 5–12)
NEUTROPHILS NFR BLD AUTO: 2.19 10*3/MM3 (ref 1.7–7)
NEUTROPHILS NFR BLD AUTO: 46.4 % (ref 42.7–76)
NITRITE UR QL STRIP: NEGATIVE
NRBC BLD AUTO-RTO: 0 /100 WBC (ref 0–0.2)
PH UR STRIP.AUTO: 5.5 [PH] (ref 5–8)
PLATELET # BLD AUTO: 281 10*3/MM3 (ref 140–450)
PMV BLD AUTO: 10 FL (ref 6–12)
POTASSIUM SERPL-SCNC: 4.3 MMOL/L (ref 3.5–5.2)
PROT SERPL-MCNC: 7.4 G/DL (ref 6–8.5)
PROT UR QL STRIP: ABNORMAL
PSA SERPL-MCNC: 0.7 NG/ML (ref 0–4)
RBC # BLD AUTO: 5.23 10*6/MM3 (ref 4.14–5.8)
RBC # UR STRIP: NORMAL /HPF
REF LAB TEST METHOD: NORMAL
SODIUM SERPL-SCNC: 144 MMOL/L (ref 136–145)
SP GR UR STRIP: 1.03 (ref 1–1.03)
SQUAMOUS #/AREA URNS HPF: NORMAL /HPF
TRIGL SERPL-MCNC: 112 MG/DL (ref 0–150)
TSH SERPL DL<=0.05 MIU/L-ACNC: 0.66 UIU/ML (ref 0.27–4.2)
UROBILINOGEN UR QL STRIP: ABNORMAL
VLDLC SERPL-MCNC: 20 MG/DL (ref 5–40)
WBC # UR STRIP: NORMAL /HPF
WBC NRBC COR # BLD AUTO: 4.72 10*3/MM3 (ref 3.4–10.8)

## 2025-03-27 PROCEDURE — 80053 COMPREHEN METABOLIC PANEL: CPT | Performed by: STUDENT IN AN ORGANIZED HEALTH CARE EDUCATION/TRAINING PROGRAM

## 2025-03-27 PROCEDURE — 81001 URINALYSIS AUTO W/SCOPE: CPT | Performed by: STUDENT IN AN ORGANIZED HEALTH CARE EDUCATION/TRAINING PROGRAM

## 2025-03-27 PROCEDURE — 80061 LIPID PANEL: CPT | Performed by: STUDENT IN AN ORGANIZED HEALTH CARE EDUCATION/TRAINING PROGRAM

## 2025-03-27 PROCEDURE — 84443 ASSAY THYROID STIM HORMONE: CPT | Performed by: STUDENT IN AN ORGANIZED HEALTH CARE EDUCATION/TRAINING PROGRAM

## 2025-03-27 PROCEDURE — 85025 COMPLETE CBC W/AUTO DIFF WBC: CPT | Performed by: STUDENT IN AN ORGANIZED HEALTH CARE EDUCATION/TRAINING PROGRAM

## 2025-03-27 PROCEDURE — 82043 UR ALBUMIN QUANTITATIVE: CPT | Performed by: STUDENT IN AN ORGANIZED HEALTH CARE EDUCATION/TRAINING PROGRAM

## 2025-03-27 PROCEDURE — 84153 ASSAY OF PSA TOTAL: CPT | Performed by: STUDENT IN AN ORGANIZED HEALTH CARE EDUCATION/TRAINING PROGRAM

## 2025-03-27 PROCEDURE — 82570 ASSAY OF URINE CREATININE: CPT | Performed by: STUDENT IN AN ORGANIZED HEALTH CARE EDUCATION/TRAINING PROGRAM

## 2025-03-27 PROCEDURE — 83036 HEMOGLOBIN GLYCOSYLATED A1C: CPT | Performed by: STUDENT IN AN ORGANIZED HEALTH CARE EDUCATION/TRAINING PROGRAM

## 2025-03-27 NOTE — ASSESSMENT & PLAN NOTE
This is chronic, and stable.   Stopped metformin due to diarrhea.  Recommend eye exam.  He was counseled on carb-consistent diet and exercise.

## 2025-03-27 NOTE — PATIENT INSTRUCTIONS
Health Maintenance, Male  A healthy lifestyle and preventive care is important for your health and wellness. Ask your health care provider about what schedule of regular examinations is right for you.  What should I know about weight and diet?    Eat a Healthy Diet  Eat plenty of vegetables, fruits, whole grains, low-fat dairy products, and lean protein.  Do not eat a lot of foods high in solid fats, added sugars, or salt.     Maintain a Healthy Weight  Regular exercise can help you achieve or maintain a healthy weight. You should:  Do at least 150 minutes of exercise each week. The exercise should increase your heart rate and make you sweat (moderate-intensity exercise).  Do strength-training exercises at least twice a week.     Watch Your Levels of Cholesterol and Blood Lipids  Have your blood tested for lipids and cholesterol every 5 years starting at 35 years of age. If you are at high risk for heart disease, you should start having your blood tested when you are 20 years old. You may need to have your cholesterol levels checked more often if:  Your lipid or cholesterol levels are high.  You are older than 50 years of age.  You are at high risk for heart disease.     What should I know about cancer screening?  Many types of cancers can be detected early and may often be prevented.  Lung Cancer  You should be screened every year for lung cancer if:  You are a current smoker who has smoked for at least 30 years.  You are a former smoker who has quit within the past 15 years.  Talk to your health care provider about your screening options, when you should start screening, and how often you should be screened.     Colorectal Cancer  Routine colorectal cancer screening usually begins at 50 years of age and should be repeated every 5-10 years until you are 75 years old. You may need to be screened more often if early forms of precancerous polyps or small growths are found. Your health care provider may recommend  screening at an earlier age if you have risk factors for colon cancer.  Your health care provider may recommend using home test kits to check for hidden blood in the stool.  A small camera at the end of a tube can be used to examine your colon (sigmoidoscopy or colonoscopy). This checks for the earliest forms of colorectal cancer.     Prostate and Testicular Cancer  Depending on your age and overall health, your health care provider may do certain tests to screen for prostate and testicular cancer.  Talk to your health care provider about any symptoms or concerns you have about testicular or prostate cancer.     Skin Cancer  Check your skin from head to toe regularly.  Tell your health care provider about any new moles or changes in moles, especially if:  There is a change in a mole’s size, shape, or color.  You have a mole that is larger than a pencil eraser.  Always use sunscreen. Apply sunscreen liberally and repeat throughout the day.  Protect yourself by wearing long sleeves, pants, a wide-brimmed hat, and sunglasses when outside.     What should I know about heart disease, diabetes, and high blood pressure?  If you are 18-39 years of age, have your blood pressure checked every 3-5 years. If you are 40 years of age or older, have your blood pressure checked every year. You should have your blood pressure measured twice--once when you are at a hospital or clinic, and once when you are not at a hospital or clinic. Record the average of the two measurements. To check your blood pressure when you are not at a hospital or clinic, you can use:  An automated blood pressure machine at a pharmacy.  A home blood pressure monitor.  Talk to your health care provider about your target blood pressure.  If you are between 45-79 years old, ask your health care provider if you should take aspirin to prevent heart disease.  Have regular diabetes screenings by checking your fasting blood sugar level.  If you are at a normal  weight and have a low risk for diabetes, have this test once every three years after the age of 45.  If you are overweight and have a high risk for diabetes, consider being tested at a younger age or more often.  A one-time screening for abdominal aortic aneurysm (AAA) by ultrasound is recommended for men aged 65-75 years who are current or former smokers.  What should I know about preventing infection?  Hepatitis B  If you have a higher risk for hepatitis B, you should be screened for this virus. Talk with your health care provider to find out if you are at risk for hepatitis B infection.  Hepatitis C  Blood testing is recommended for:  Everyone born from 1945 through 1965.  Anyone with known risk factors for hepatitis C.     Sexually Transmitted Diseases (STDs)  You should be screened each year for STDs including gonorrhea and chlamydia if:  You are sexually active and are younger than 24 years of age.  You are older than 24 years of age and your health care provider tells you that you are at risk for this type of infection.  Your sexual activity has changed since you were last screened and you are at an increased risk for chlamydia or gonorrhea. Ask your health care provider if you are at risk.  Talk with your health care provider about whether you are at high risk of being infected with HIV. Your health care provider may recommend a prescription medicine to help prevent HIV infection.     What else can I do?    Schedule regular health, dental, and eye exams.  Stay current with your vaccines (immunizations).  Do not use any tobacco products, such as cigarettes, chewing tobacco, and e-cigarettes. If you need help quitting, ask your health care provider.  Limit alcohol intake to no more than 2 drinks per day. One drink equals 12 ounces of beer, 5 ounces of wine, or 1½ ounces of hard liquor.  Do not use street drugs.  Do not share needles.  Ask your health care provider for help if you need support or information  about quitting drugs.  Tell your health care provider if you often feel depressed.  Tell your health care provider if you have ever been abused or do not feel safe at home.      This information is not intended to replace advice given to you by your health care provider. Make sure you discuss any questions you have with your health care provider.  Document Released: 06/15/2009 Document Revised: 08/16/2017 Document Reviewed: 09/20/2016  [employer] Patient Education © 2018 [employer]    Healthy Eating  Following a healthy eating pattern may help you to achieve and maintain a healthy body weight, reduce the risk of chronic disease, and live a long and productive life. It is important to follow a healthy eating pattern at an appropriate calorie level for your body. Your nutritional needs should be met primarily through food by choosing a variety of nutrient-rich foods.  What are tips for following this plan?  Reading food labels  Read labels and choose the following:  Reduced or low sodium.  Juices with 100% fruit juice.  Foods with low saturated fats and high polyunsaturated and monounsaturated fats.  Foods with whole grains, such as whole wheat, cracked wheat, brown rice, and wild rice.  Whole grains that are fortified with folic acid. This is recommended for women who are pregnant or who want to become pregnant.  Read labels and avoid the following:  Foods with a lot of added sugars. These include foods that contain brown sugar, corn sweetener, corn syrup, dextrose, fructose, glucose, high-fructose corn syrup, honey, invert sugar, lactose, malt syrup, maltose, molasses, raw sugar, sucrose, trehalose, or turbinado sugar.  Do not eat more than the following amounts of added sugar per day:  6 teaspoons (25 g) for women.  9 teaspoons (38 g) for men.  Foods that contain processed or refined starches and grains.  Refined grain products, such as white flour, degermed cornmeal, white bread, and white  "rice.  Shopping  Choose nutrient-rich snacks, such as vegetables, whole fruits, and nuts. Avoid high-calorie and high-sugar snacks, such as potato chips, fruit snacks, and candy.  Use oil-based dressings and spreads on foods instead of solid fats such as butter, stick margarine, or cream cheese.  Limit pre-made sauces, mixes, and \"instant\" products such as flavored rice, instant noodles, and ready-made pasta.  Try more plant-protein sources, such as tofu, tempeh, black beans, edamame, lentils, nuts, and seeds.  Explore eating plans such as the Mediterranean diet or vegetarian diet.  Cooking  Use oil to sauté or stir-zapata foods instead of solid fats such as butter, stick margarine, or lard.  Try baking, boiling, grilling, or broiling instead of frying.  Remove the fatty part of meats before cooking.  Steam vegetables in water or broth.  Meal planning    At meals, imagine dividing your plate into fourths:  One-half of your plate is fruits and vegetables.  One-fourth of your plate is whole grains.  One-fourth of your plate is protein, especially lean meats, poultry, eggs, tofu, beans, or nuts.  Include low-fat dairy as part of your daily diet.     Lifestyle  Choose healthy options in all settings, including home, work, school, restaurants, or stores.  Prepare your food safely:  Wash your hands after handling raw meats.  Keep food preparation surfaces clean by regularly washing with hot, soapy water.  Keep raw meats separate from ready-to-eat foods, such as fruits and vegetables.  , meat, poultry, and eggs to the recommended internal temperature.  Store foods at safe temperatures. In general:  Keep cold foods at 40°F (4.4°C) or below.  Keep hot foods at 140°F (60°C) or above.  Keep your freezer at 0°F (-17.8°C) or below.  Foods are no longer safe to eat when they have been between the temperatures of 40°-140°F (4.4-60°C) for more than 2 hours.  What foods should I eat?  Fruits  Aim to eat 2 cup-equivalents of " fresh, canned (in natural juice), or frozen fruits each day. Examples of 1 cup-equivalent of fruit include 1 small apple, 8 large strawberries, 1 cup canned fruit, ½ cup dried fruit, or 1 cup 100% juice.  Vegetables  Aim to eat 2½-3 cup-equivalents of fresh and frozen vegetables each day, including different varieties and colors. Examples of 1 cup-equivalent of vegetables include 2 medium carrots, 2 cups raw, leafy greens, 1 cup chopped vegetable (raw or cooked), or 1 medium baked potato.  Grains  Aim to eat 6 ounce-equivalents of whole grains each day. Examples of 1 ounce-equivalent of grains include 1 slice of bread, 1 cup ready-to-eat cereal, 3 cups popcorn, or ½ cup cooked rice, pasta, or cereal.  Meats and other proteins  Aim to eat 5-6 ounce-equivalents of protein each day. Examples of 1 ounce-equivalent of protein include 1 egg, 1/2 cup nuts or seeds, or 1 tablespoon (16 g) peanut butter. A cut of meat or fish that is the size of a deck of cards is about 3-4 ounce-equivalents.  Of the protein you eat each week, try to have at least 8 ounces come from seafood. This includes salmon, trout, herring, and anchovies.  Dairy  Aim to eat 3 cup-equivalents of fat-free or low-fat dairy each day. Examples of 1 cup-equivalent of dairy include 1 cup (240 mL) milk, 8 ounces (250 g) yogurt, 1½ ounces (44 g) natural cheese, or 1 cup (240 mL) fortified soy milk.  Fats and oils  Aim for about 5 teaspoons (21 g) per day. Choose monounsaturated fats, such as canola and olive oils, avocados, peanut butter, and most nuts, or polyunsaturated fats, such as sunflower, corn, and soybean oils, walnuts, pine nuts, sesame seeds, sunflower seeds, and flaxseed.  Beverages  Aim for six 8-oz glasses of water per day. Limit coffee to three to five 8-oz cups per day.  Limit caffeinated beverages that have added calories, such as soda and energy drinks.  Limit alcohol intake to no more than 1 drink a day for nonpregnant women and 2 drinks a day  for men. One drink equals 12 oz of beer (355 mL), 5 oz of wine (148 mL), or 1½ oz of hard liquor (44 mL).  Seasoning and other foods  Avoid adding excess amounts of salt to your foods. Try flavoring foods with herbs and spices instead of salt.  Avoid adding sugar to foods.  Try using oil-based dressings, sauces, and spreads instead of solid fats.  This information is based on general [place] nutrition guidelines. For more information, visit choosemyplate.gov. Exact amounts may vary based on your nutrition needs.  Summary  A healthy eating plan may help you to maintain a healthy weight, reduce the risk of chronic diseases, and stay active throughout your life.  Plan your meals. Make sure you eat the right portions of a variety of nutrient-rich foods.  Try baking, boiling, grilling, or broiling instead of frying.  Choose healthy options in all settings, including home, work, school, restaurants, or stores.  This information is not intended to replace advice given to you by your health care provider. Make sure you discuss any questions you have with your health care provider.  Document Revised: 04/01/2019 Document Reviewed: 04/01/2019  [employer] Patient Education © 2021 [employer]    Exercising to Stay Healthy  To become healthy and stay healthy, it is recommended that you do moderate-intensity and vigorous-intensity exercise. You can tell that you are exercising at a moderate intensity if your heart starts beating faster and you start breathing faster but can still hold a conversation. You can tell that you are exercising at a vigorous intensity if you are breathing much harder and faster and cannot hold a conversation while exercising.  Exercising regularly is important. It has many health benefits, such as:  Improving overall fitness, flexibility, and endurance.  Increasing bone density.  Helping with weight control.  Decreasing body fat.  Increasing muscle strength.  Reducing stress and tension.  Improving overall  health.  How often should I exercise?  Choose an activity that you enjoy, and set realistic goals. Your health care provider can help you make an activity plan that works for you.  Exercise regularly as told by your health care provider. This may include:  Doing strength training two times a week, such as:  Lifting weights.  Using resistance bands.  Push-ups.  Sit-ups.  Yoga.  Doing a certain intensity of exercise for a given amount of time. Choose from these options:  A total of 150 minutes of moderate-intensity exercise every week.  A total of 75 minutes of vigorous-intensity exercise every week.  A mix of moderate-intensity and vigorous-intensity exercise every week.  Children, pregnant women, people who have not exercised regularly, people who are overweight, and older adults may need to talk with a health care provider about what activities are safe to do. If you have a medical condition, be sure to talk with your health care provider before you start a new exercise program.  What are some exercise ideas?    Moderate-intensity exercise ideas include:  Walking 1 mile (1.6 km) in about 15 minutes.  Biking.  Hiking.  Golfing.  Dancing.  Water aerobics.  Vigorous-intensity exercise ideas include:  Walking 4.5 miles (7.2 km) or more in about 1 hour.  Jogging or running 5 miles (8 km) in about 1 hour.  Biking 10 miles (16.1 km) or more in about 1 hour.  Lap swimming.  Roller-skating or in-line skating.  Cross-country skiing.  Vigorous competitive sports, such as football, basketball, and soccer.  Jumping rope.  Aerobic dancing.  What are some everyday activities that can help me to get exercise?  Yard work, such as:  Pushing a .  Raking and bagging leaves.  Washing your car.  Pushing a stroller.  Shoveling snow.  Gardening.  Washing windows or floors.  How can I be more active in my day-to-day activities?  Use stairs instead of an elevator.  Take a walk during your lunch break.  If you drive, park your car  farther away from your work or school.  If you take public transportation, get off one stop early and walk the rest of the way.  Stand up or walk around during all of your indoor phone calls.  Get up, stretch, and walk around every 30 minutes throughout the day.  Enjoy exercise with a friend. Support to continue exercising will help you keep a regular routine of activity.  What guidelines can I follow while exercising?  Before you start a new exercise program, talk with your health care provider.  Do not exercise so much that you hurt yourself, feel dizzy, or get very short of breath.  Wear comfortable clothes and wear shoes with good support.  Drink plenty of water while you exercise to prevent dehydration or heat stroke.  Work out until your breathing and your heartbeat get faster.  Where to find more information  [place] Department of Health and Human Services: www.hhs.gov  Centers for Disease Control and Prevention (CDC): www.cdc.gov  Summary  Exercising regularly is important. It will improve your overall fitness, flexibility, and endurance.  Regular exercise also will improve your overall health. It can help you control your weight, reduce stress, and improve your bone density.  Do not exercise so much that you hurt yourself, feel dizzy, or get very short of breath.  Before you start a new exercise program, talk with your health care provider.  This information is not intended to replace advice given to you by your health care provider. Make sure you discuss any questions you have with your health care provider.  Document Revised: 11/30/2018 Document Reviewed: 11/08/2018  [employer] Patient Education © 2021 [employer]

## 2025-03-27 NOTE — ASSESSMENT & PLAN NOTE
This is chronic and improving.   The patient has failed Crestor in the past and also failed Zetia.   He will continue Lipitor 80 mg daily. Repeat lipid and cmp today

## 2025-03-27 NOTE — PROGRESS NOTES
Male Physical Note      Date: 2025   Patient Name: Mal Magallanes  : 1965   MRN: [id number]     Chief Complaint:    Chief Complaint   Patient presents with    Annual Exam       History of Present Illness: Mal Magallanes is a 59 y.o. male with history of prior stroke(anterior right pontine) 2023 , HTN, HLD, T2DM, obesity who is here today for their annual health maintenance and physical.    HPI  History of Present Illness  The patient presents for an annual checkup.    He reports experiencing numbness in the tip of his foot, which he attributes to a previous nail gun injury. Additionally, he mentions numbness in his heel, which he believes is due to an unusual sitting position on a chair. This numbness resolves upon standing. He does not wish to pursue further workup at this time.    He has not rescheduled his appointment with Dr. Segura, a cardiologist, as he does not perceive a need for it. He reports no chest pain or palpitations.    He maintains an active lifestyle, walking throughout the day, and adheres to a diet that excludes sweets, carbohydrates, and fast food. He is currently on aspirin 325 mg and losartan 25 mg daily.    He has not had an eye examination this year and has not seen a dentist recently. He has never received influenza or pneumonia vaccines. He is under the care of [facility] for skin-related issues. He underwent a colonoscopy and endoscopy concurrently in  at [facility].    MEDICATIONS  Current: Aspirin 325 mg, losartan 25 mg once a day.  Past: Bactrim.    IMMUNIZATIONS  He is up to date on his tetanus vaccine.      Subjective      Review of Systems:   Review of Systems    Past Medical History, Social History, Family History and Care Team were all reviewed with patient and updated as appropriate.     Medications:     Current Outpatient Medications:     aspirin 81 MG EC tablet, Take 1 tablet by mouth Daily., Disp: , Rfl:     atorvastatin (Lipitor) 80 MG  tablet, Take 1 tablet by mouth Daily., Disp: 90 tablet, Rfl: 3    calcipotriene (DOVONEX) 0.005 % cream, APPLY A THIN LAYER TO THE AFFECTED AREAS ON FACE TWICE A DAY FOR 2 WEEKS, TAKE 1 TWO WEEK BREAK. REPEAT CYCLE. MIX WITH EFUDEX., Disp: , Rfl:     coenzyme Q10 100 MG capsule, Take 1 capsule by mouth Daily., Disp: , Rfl:     ibuprofen (ADVIL,MOTRIN) 800 MG tablet, Take 1 tablet by mouth Every 8 (Eight) Hours As Needed for Moderate Pain., Disp: 90 tablet, Rfl: 1    losartan (Cozaar) 25 MG tablet, Take 1 tablet by mouth Daily., Disp: 90 tablet, Rfl: 3    cephalexin (Keflex) 500 MG capsule, Take 1 capsule by mouth 4 (Four) Times a Day. (Patient not taking: Reported on 3/27/2025), Disp: 28 capsule, Rfl: 0    sulfamethoxazole-trimethoprim (BACTRIM DS,SEPTRA DS) 800-160 MG per tablet, Take 1 tablet by mouth 2 (Two) Times a Day. (Patient not taking: Reported on 3/27/2025), Disp: 20 tablet, Rfl: 0    Allergies:   Allergies   Allergen Reactions    Metformin GI Intolerance       Immunizations:  Td/Tdap(Booster Q 10 yrs):  UTD  Flu (Yearly):  due, recommend, refuses  Pneumonia: due, recommend, refuses  COVID:  due, recommend, refuses  Shingrix: due, recommend refuses  Immunization History   Administered Date(s) Administered    Tdap 10/15/2024        Colorectal Screenin, 7 year f/u per prior pcp note   Last Completed Colonoscopy    This patient has no relevant Health Maintenance data.         Hep C (Age 18-79 once):  NR in past  HIV (Age 15-65 once) : NR in past  PSA (Over age 50, C Level Recommendation):  due, 25    A1c: due  Lipid panel: due  The ASCVD Risk score (Tiffany NICHOLAS, et al., 2019) failed to calculate for the following reasons:    Risk score cannot be calculated because patient has a medical history suggesting prior/existing ASCVD    Dermatology: [facility]  Ophthalmologist: due, recommend  Dentist: regularly    Tobacco Use: Low Risk  (3/27/2025)    Patient History     Smoking Tobacco Use: Never  "    Smokeless Tobacco Use: Never     Passive Exposure: Never       Social History     Substance and Sexual Activity   Alcohol Use Never        Social History     Substance and Sexual Activity   Drug Use Never        Diet/Physical activity:   Very active lifestyle, walking \"all the time\". Low carb diet, avoiding processed foods      PHQ-2 Depression Screening  Little interest or pleasure in doing things? Not at all   Feeling down, depressed, or hopeless? Not at all   PHQ-2 Total Score 0           Objective     Physical Exam:  Vital Signs:   Vitals:    03/27/25 0759   BP: 130/80   BP Location: Left arm   Patient Position: Sitting   Cuff Size: Adult   Pulse: 69   Resp: 20   Temp: 97 °F (36.1 °C)   TempSrc: Temporal   SpO2: 97%   Weight: 114 kg (251 lb)   Height: 188 cm (74.02\")   PainSc: 0-No pain     Body mass index is 32.21 kg/m².     Physical Exam  Vitals reviewed.   Constitutional:       General: He is not in acute distress.     Appearance: Normal appearance. He is obese. He is not ill-appearing or toxic-appearing.   HENT:      Head: Normocephalic and atraumatic.      Right Ear: External ear normal.      Left Ear: External ear normal.      Nose: Nose normal. No congestion.      Mouth/Throat:      Mouth: Mucous membranes are moist.   Eyes:      General: No scleral icterus.        Right eye: No discharge.         Left eye: No discharge.      Extraocular Movements: Extraocular movements intact.   Cardiovascular:      Rate and Rhythm: Normal rate and regular rhythm.      Pulses: Normal pulses.      Heart sounds: Normal heart sounds.   Pulmonary:      Effort: Pulmonary effort is normal. No respiratory distress.      Breath sounds: Normal breath sounds.   Abdominal:      General: Abdomen is flat. Bowel sounds are normal. There is no distension.      Palpations: Abdomen is soft. There is no mass.      Tenderness: There is no abdominal tenderness. There is no guarding or rebound.   Musculoskeletal:      Cervical back: " Normal range of motion and neck supple. No rigidity or tenderness.   Lymphadenopathy:      Cervical: No cervical adenopathy.   Skin:     General: Skin is warm and dry.      Capillary Refill: Capillary refill takes less than 2 seconds.      Coloration: Skin is not jaundiced or pale.      Findings: No lesion.   Neurological:      General: No focal deficit present.      Mental Status: He is alert and oriented to person, place, and time. Mental status is at baseline.   Psychiatric:         Mood and Affect: Mood normal.         Behavior: Behavior normal.         Judgment: Judgment normal.       Physical Exam        Procedures  Results        Assessment / Plan      Assessment/Plan:   Problem List Items Addressed This Visit       HTN (hypertension)    Current Assessment & Plan   Chronic, improving. Continue losartan 25mg daily.         HLD (hyperlipidemia)    Overview   - failed crestor and zetia in past         Current Assessment & Plan   This is chronic and improving.   The patient has failed Crestor in the past and also failed Zetia.   He will continue Lipitor 80 mg daily. Repeat lipid and cmp today         Relevant Orders    Lipid panel    Type 2 diabetes mellitus with hyperglycemia, without long-term current use of insulin    Current Assessment & Plan   This is chronic, and stable.   Stopped metformin due to diarrhea.  Recommend eye exam.  He was counseled on carb-consistent diet and exercise.         Relevant Orders    TSH Rfx On Abnormal To Free T4    Hemoglobin A1c    Microalbumin / Creatinine Urine Ratio - Urine, Clean Catch    Microalbuminuria    Current Assessment & Plan   Continue losartan, good diabetes control.         Relevant Orders    Microalbumin / Creatinine Urine Ratio - Urine, Clean Catch    Urinalysis With Microscopic - Urine, Clean Catch    Class 1 obesity due to excess calories with serious comorbidity and body mass index (BMI) of 32.0 to 32.9 in adult     Other Visit Diagnoses         Routine  health maintenance    -  Primary    Relevant Orders    CBC w AUTO Differential    Comprehensive metabolic panel    PSA DIAGNOSTIC      Screening PSA (prostate specific antigen)        Relevant Orders    PSA DIAGNOSTIC      Colon cancer screening              Assessment & Plan   Health maintenance.  He was encouraged to receive the influenza, shingrix, covid and pneumonia vaccines, which he declined. He was also advised to schedule an eye examination due to slightly elevated blood sugar levels. Routine labs will be conducted today to assess kidney function, electrolytes, thyroid, blood sugar level, and A1c. The results will be communicated via [employer].    3. Dysphagia.  He continues to experience difficulty swallowing certain foods and pills. He was advised to consider a follow-up with a GI specialist if the symptoms persist or worsen. Not interested at this time, denies reflux symptoms    4. Medication management.  He is currently taking aspirin 325 mg and losartan 25 mg once a day as per the neurologist's recommendation. No changes to his medication regimen were made.      PROCEDURE  The patient underwent a colonoscopy and endoscopy concurrently in 2020 at [facility].      Healthcare Maintenance:  Counseling provided based on age appropriate USPSTF guidelines.  BMI is >= 30 and <35. (Class 1 Obesity). The following options were offered after discussion;: exercise counseling/recommendations and nutrition counseling/recommendations    Mal Magallanes voices understanding and acceptance of this advice and will call back with any further questions or concerns. AVS with preventive healthcare tips printed for patient.     “Discussed risks/benefits to vaccination, reviewed components of the vaccine, discussed VIS, discussed informed consent, informed consent obtained. Patient/Parent was allowed to accept or refuse vaccine. Questions answered to satisfactory state of patient/Parent. We reviewed typical age appropriate and  seasonally appropriate vaccinations. Reviewed immunization history and updated state vaccination form as needed. Patient was counseled on COVID-19  Influenza  Prevnar 20  Zoster    Follow Up:   Return in about 6 months (around 9/27/2025) for Recheck blood pressure, diabetes.      Eddie Ashley MD   [facility] Rudy Franco    Patient or patient representative verbalized consent for the use of Ambient Listening during the visit with  Eddie Ashley MD for chart documentation. 3/27/2025  08:25 EDT

## 2025-03-28 ENCOUNTER — RESULTS FOLLOW-UP (OUTPATIENT)
Dept: INTERNAL MEDICINE | Facility: CLINIC | Age: 60
End: 2025-03-28
Payer: COMMERCIAL

## 2025-03-28 DIAGNOSIS — I63.9 CEREBROVASCULAR ACCIDENT (CVA), UNSPECIFIED MECHANISM: ICD-10-CM

## 2025-03-28 DIAGNOSIS — E78.5 HYPERLIPIDEMIA, UNSPECIFIED HYPERLIPIDEMIA TYPE: Primary | ICD-10-CM

## 2025-03-28 LAB
ALBUMIN UR-MCNC: 17.1 MG/DL
CREAT UR-MCNC: 290.5 MG/DL
MICROALBUMIN/CREAT UR: 58.9 MG/G (ref 0–29)

## 2025-03-31 DIAGNOSIS — I10 HYPERTENSION, UNSPECIFIED TYPE: ICD-10-CM

## 2025-03-31 RX ORDER — LOSARTAN POTASSIUM 25 MG/1
25 TABLET ORAL DAILY
Qty: 90 TABLET | Refills: 3 | Status: SHIPPED | OUTPATIENT
Start: 2025-03-31

## 2025-04-03 ENCOUNTER — TELEPHONE (OUTPATIENT)
Dept: INTERNAL MEDICINE | Facility: CLINIC | Age: 60
End: 2025-04-03
Payer: COMMERCIAL

## 2025-04-03 NOTE — TELEPHONE ENCOUNTER
PA Sent to cover my meds   Key: [id number]  Waiting for determination   Repatha auto inject  140 mg

## 2025-04-14 DIAGNOSIS — E78.5 HYPERLIPIDEMIA, UNSPECIFIED HYPERLIPIDEMIA TYPE: ICD-10-CM

## 2025-04-15 RX ORDER — ATORVASTATIN CALCIUM 80 MG/1
80 TABLET, FILM COATED ORAL DAILY
Qty: 90 TABLET | Refills: 3 | Status: SHIPPED | OUTPATIENT
Start: 2025-04-15

## 2025-04-21 ENCOUNTER — TELEPHONE (OUTPATIENT)
Dept: INTERNAL MEDICINE | Facility: CLINIC | Age: 60
End: 2025-04-21
Payer: COMMERCIAL

## 2025-04-21 NOTE — TELEPHONE ENCOUNTER
Caller: YRIS SAGE    Relationship: Emergency Contact    Best call back number: [phone]     What medication are you requesting:   FLOUROURACIL 5% CREAM  CALCIPOTRIENE 5% CREAM    What are your current symptoms: SKIN CANCER    Have you had these symptoms before:    [x] Yes  [] No    Have you been treated for these symptoms before:   [x] Yes  [] No    If a prescription is needed, what is your preferred pharmacy and phone number: [facility] [id number] - [place], [place] - [facility] [address]  - [phone]  - [phone] FX     Additional notes: THEY'VE SPOKEN WITH THE PHARMACY AND THIS IS THE LEAST EXPENSIVE PLACE THEY CAN GET IT.

## 2025-04-22 NOTE — TELEPHONE ENCOUNTER
Spoke to emergency contact and notified of message. She stated you previously sent the creams in last year and wanted to know if you can send them in or does it have to be dermatology.

## 2025-04-22 NOTE — TELEPHONE ENCOUNTER
Typically this is done for 4 weeks. If they are seeing more lesions etc would recommend repeat evaluation by dermatology.    Dr. Ashley